# Patient Record
Sex: FEMALE | Race: WHITE | NOT HISPANIC OR LATINO | Employment: UNEMPLOYED | URBAN - METROPOLITAN AREA
[De-identification: names, ages, dates, MRNs, and addresses within clinical notes are randomized per-mention and may not be internally consistent; named-entity substitution may affect disease eponyms.]

---

## 2017-01-09 ENCOUNTER — HOSPITAL ENCOUNTER (OUTPATIENT)
Dept: RADIATION ONCOLOGY | Facility: MEDICAL CENTER | Age: 57
End: 2017-01-31
Attending: RADIOLOGY
Payer: MEDICAID

## 2017-01-09 PROCEDURE — 77332 RADIATION TREATMENT AID(S): CPT | Mod: 26 | Performed by: RADIOLOGY

## 2017-01-09 PROCEDURE — 77332 RADIATION TREATMENT AID(S): CPT | Performed by: RADIOLOGY

## 2017-01-09 PROCEDURE — 77263 THER RADIOLOGY TX PLNG CPLX: CPT | Performed by: RADIOLOGY

## 2017-01-09 PROCEDURE — 77290 THER RAD SIMULAJ FIELD CPLX: CPT | Performed by: RADIOLOGY

## 2017-01-09 PROCEDURE — 77290 THER RAD SIMULAJ FIELD CPLX: CPT | Mod: 26 | Performed by: RADIOLOGY

## 2017-01-16 PROCEDURE — 77295 3-D RADIOTHERAPY PLAN: CPT | Performed by: RADIOLOGY

## 2017-01-16 PROCEDURE — 77334 RADIATION TREATMENT AID(S): CPT | Performed by: RADIOLOGY

## 2017-01-16 PROCEDURE — 77295 3-D RADIOTHERAPY PLAN: CPT | Mod: 26 | Performed by: RADIOLOGY

## 2017-01-16 PROCEDURE — 77300 RADIATION THERAPY DOSE PLAN: CPT | Mod: 26 | Performed by: RADIOLOGY

## 2017-01-16 PROCEDURE — 77334 RADIATION TREATMENT AID(S): CPT | Mod: 26 | Performed by: RADIOLOGY

## 2017-01-16 PROCEDURE — 77300 RADIATION THERAPY DOSE PLAN: CPT | Performed by: RADIOLOGY

## 2017-01-17 PROCEDURE — 77412 RADIATION TX DELIVERY LVL 3: CPT | Performed by: RADIOLOGY

## 2017-01-17 PROCEDURE — 77280 THER RAD SIMULAJ FIELD SMPL: CPT | Performed by: RADIOLOGY

## 2017-01-17 PROCEDURE — 77280 THER RAD SIMULAJ FIELD SMPL: CPT | Mod: 26 | Performed by: RADIOLOGY

## 2017-01-18 ENCOUNTER — APPOINTMENT (OUTPATIENT)
Dept: RADIATION ONCOLOGY | Facility: MEDICAL CENTER | Age: 57
End: 2017-01-18
Attending: RADIOLOGY
Payer: MEDICAID

## 2017-01-18 PROCEDURE — 77412 RADIATION TX DELIVERY LVL 3: CPT | Performed by: RADIOLOGY

## 2017-01-18 PROCEDURE — 77417 THER RADIOLOGY PORT IMAGE(S): CPT | Performed by: RADIOLOGY

## 2017-01-19 PROCEDURE — 77336 RADIATION PHYSICS CONSULT: CPT | Performed by: RADIOLOGY

## 2017-01-19 PROCEDURE — 77412 RADIATION TX DELIVERY LVL 3: CPT | Performed by: RADIOLOGY

## 2017-01-20 PROCEDURE — 77412 RADIATION TX DELIVERY LVL 3: CPT | Performed by: RADIOLOGY

## 2017-01-23 PROCEDURE — 77412 RADIATION TX DELIVERY LVL 3: CPT | Performed by: RADIOLOGY

## 2017-01-23 PROCEDURE — 77427 RADIATION TX MANAGEMENT X5: CPT | Performed by: RADIOLOGY

## 2017-01-23 PROCEDURE — 77417 THER RADIOLOGY PORT IMAGE(S): CPT | Performed by: RADIOLOGY

## 2017-01-24 PROCEDURE — 77412 RADIATION TX DELIVERY LVL 3: CPT | Performed by: RADIOLOGY

## 2017-01-25 PROCEDURE — 77412 RADIATION TX DELIVERY LVL 3: CPT | Performed by: RADIOLOGY

## 2017-01-26 PROCEDURE — 77336 RADIATION PHYSICS CONSULT: CPT | Performed by: RADIOLOGY

## 2017-01-26 PROCEDURE — 77412 RADIATION TX DELIVERY LVL 3: CPT | Performed by: RADIOLOGY

## 2017-01-27 PROCEDURE — 77412 RADIATION TX DELIVERY LVL 3: CPT | Performed by: RADIOLOGY

## 2017-01-30 ENCOUNTER — HOSPITAL ENCOUNTER (OUTPATIENT)
Dept: RADIATION ONCOLOGY | Facility: MEDICAL CENTER | Age: 57
End: 2017-01-30

## 2017-01-30 PROCEDURE — 77417 THER RADIOLOGY PORT IMAGE(S): CPT | Performed by: RADIOLOGY

## 2017-01-30 PROCEDURE — 77427 RADIATION TX MANAGEMENT X5: CPT | Performed by: RADIOLOGY

## 2017-01-30 PROCEDURE — 77412 RADIATION TX DELIVERY LVL 3: CPT | Performed by: RADIOLOGY

## 2017-01-31 ENCOUNTER — HOSPITAL ENCOUNTER (OUTPATIENT)
Dept: RADIATION ONCOLOGY | Facility: MEDICAL CENTER | Age: 57
End: 2017-01-31

## 2017-01-31 PROCEDURE — 77412 RADIATION TX DELIVERY LVL 3: CPT | Performed by: RADIOLOGY

## 2017-02-01 ENCOUNTER — HOSPITAL ENCOUNTER (OUTPATIENT)
Dept: RADIATION ONCOLOGY | Facility: MEDICAL CENTER | Age: 57
End: 2017-02-28
Attending: RADIOLOGY
Payer: MEDICAID

## 2017-02-01 ENCOUNTER — HOSPITAL ENCOUNTER (OUTPATIENT)
Dept: RADIATION ONCOLOGY | Facility: MEDICAL CENTER | Age: 57
End: 2017-02-01

## 2017-02-01 PROCEDURE — 77412 RADIATION TX DELIVERY LVL 3: CPT | Performed by: RADIOLOGY

## 2017-02-02 PROCEDURE — 77336 RADIATION PHYSICS CONSULT: CPT | Performed by: RADIOLOGY

## 2017-02-02 PROCEDURE — 77412 RADIATION TX DELIVERY LVL 3: CPT | Performed by: RADIOLOGY

## 2017-02-03 PROCEDURE — 77412 RADIATION TX DELIVERY LVL 3: CPT | Performed by: RADIOLOGY

## 2017-02-06 PROCEDURE — 77417 THER RADIOLOGY PORT IMAGE(S): CPT | Performed by: RADIOLOGY

## 2017-02-06 PROCEDURE — 77427 RADIATION TX MANAGEMENT X5: CPT | Performed by: RADIOLOGY

## 2017-02-06 PROCEDURE — 77412 RADIATION TX DELIVERY LVL 3: CPT | Performed by: RADIOLOGY

## 2017-03-06 ENCOUNTER — HOSPITAL ENCOUNTER (OUTPATIENT)
Dept: RADIOLOGY | Facility: MEDICAL CENTER | Age: 57
End: 2017-03-06

## 2017-03-21 ENCOUNTER — HOSPITAL ENCOUNTER (OUTPATIENT)
Dept: RADIATION ONCOLOGY | Facility: MEDICAL CENTER | Age: 57
End: 2017-03-31
Attending: RADIOLOGY
Payer: MEDICAID

## 2017-03-21 ENCOUNTER — AMB ONCOLOGY NURSE NAVIGATOR ENCOUNTER (OUTPATIENT)
Dept: OTHER | Facility: MEDICAL CENTER | Age: 57
End: 2017-03-21

## 2017-03-21 DIAGNOSIS — C50.512 BREAST CANCER OF LOWER-OUTER QUADRANT OF LEFT FEMALE BREAST (HCC): ICD-10-CM

## 2017-03-21 DIAGNOSIS — Z65.8 PSYCHOSOCIAL DISTRESS: ICD-10-CM

## 2017-03-21 PROCEDURE — 99212 OFFICE O/P EST SF 10 MIN: CPT | Performed by: RADIOLOGY

## 2017-03-21 NOTE — PROGRESS NOTES
Met with patient to review breast cancer treatment summary and survivorship care plan. Follow up care plan is preliminary. Pt had not yet followed up with her surgeon or seen Medical Oncology. Dr. Robles stated he has referred her to medical oncology and we agreed to update her care plan once she has seen her med onc for discussion of endocrine therapy. Pt requested the treatment summary/survivorship care plan be given to her at this time but stated she understands that it is incomplete for now.     Late or long term effects noted at this visit: fatigue, some pain in the surgery area, pt states both of these are improving.  Pt denied anxiety/depression. Pt denied financial or transportation barriers. Pt is a current smoker, provided her with cessation resources.   General [facility] breast cancer follow up guidelines reviewed with patient. Patient referred to treating physician for individualized follow up schedule and recommendations / questions. Plan is to meet with her again once she has seen her medical oncologist, and be available for questions.     Scanned to EPIC

## 2017-03-22 NOTE — PROCEDURES
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    INTERVAL HISTORY:  I had the pleasure of seeing the patient today in followup   for her breast cancer.  From a symptom standpoint, she states she is doing   remarkably well.  Her erythema and redness has returned back to baseline.  She   does not have any symptoms she would attribute to her radiation at this time.    She has not yet met with medical oncology to discuss any role of tamoxifen   or aromatase inhibitor.  Therefore, I will make a referral to Dr. Kahn for   this formal discussion.  I will put in for her mammograms for October, which   is her annual mammogram date and plan on seeing her after that time.  Should   she have any questions or concerns before that time, she should feel free to   contact me.       ____________________________________     MD HERMELINDO Perera / NTS    DD:  03/21/2017 15:43:44  DT:  03/22/2017 03:22:03    D#:  [id number]  Job#:  [id number]    cc: CHRIS MELO MD, MOHSEN TAMASABY MD, Caesar Kahn MD

## 2017-05-03 ENCOUNTER — PATIENT OUTREACH (OUTPATIENT)
Dept: OTHER | Facility: MEDICAL CENTER | Age: 57
End: 2017-05-03

## 2017-05-09 ENCOUNTER — OFFICE VISIT (OUTPATIENT)
Dept: HEMATOLOGY ONCOLOGY | Facility: MEDICAL CENTER | Age: 57
End: 2017-05-09
Payer: MEDICAID

## 2017-05-09 VITALS
OXYGEN SATURATION: 95 % | RESPIRATION RATE: 16 BRPM | HEART RATE: 84 BPM | BODY MASS INDEX: 36.75 KG/M2 | TEMPERATURE: 98.3 F | WEIGHT: 187.17 LBS | HEIGHT: 60 IN | SYSTOLIC BLOOD PRESSURE: 146 MMHG | DIASTOLIC BLOOD PRESSURE: 94 MMHG

## 2017-05-09 DIAGNOSIS — D48.62 NEOPLASM OF UNCERTAIN BEHAVIOR OF BREAST, LEFT: ICD-10-CM

## 2017-05-09 PROCEDURE — 99203 OFFICE O/P NEW LOW 30 MIN: CPT | Performed by: INTERNAL MEDICINE

## 2017-05-09 RX ORDER — LEVOTHYROXINE SODIUM 0.1 MG/1
100 TABLET ORAL
Status: ON HOLD | COMMUNITY
Start: 2015-10-30 | End: 2018-03-23

## 2017-05-09 RX ORDER — ANASTROZOLE 1 MG/1
1 TABLET ORAL DAILY
Qty: 30 TAB | Refills: 6 | Status: SHIPPED | OUTPATIENT
Start: 2017-05-09 | End: 2018-01-22 | Stop reason: SDUPTHER

## 2017-05-09 ASSESSMENT — PAIN SCALES - GENERAL: PAINLEVEL: NO PAIN

## 2017-05-09 NOTE — MR AVS SNAPSHOT
Lupis Colunga   2017 10:40 AM   Office Visit   MRN: [id number]    Department:  [facility]   Dept Phone:  [phone]    Description:  Female : 1960   Provider:  Caesar Kahn M.D.           Reason for Visit     New Patient Malignant neoplasm of lower outer quadrant of left breast. Ref: Freedom Robles      Allergies as of 2017     Allergen Noted Reactions    Penicillins 2008   Anaphylaxis    Tolerates cephalosporins; blisters      You were diagnosed with     Neoplasm of uncertain behavior of breast, left   [535552]         Vital Signs     Blood Pressure Pulse Temperature Respirations Height Weight    146/94 mmHg 84 36.8 °C (98.3 °F) 16 1.524 m (5') 84.9 kg (187 lb 2.7 oz)    Body Mass Index Oxygen Saturation Breastfeeding? Smoking Status          36.55 kg/m2 95% No Current Every Day Smoker        Basic Information     Date Of Birth Sex Race Ethnicity Preferred Language    1960 Female White Non- English      Your appointments     Aug 29, 2017 10:30 AM   BONE DENSITY (DEXASCAN) with [facility] 1   [facility] ([address])    [address] [place] [address] [address]  [place] [id number] [phone]           No calcium supplements 24 hours prior to exam, including antacids or multivitamins w/calcium.  Pt may eat and drink normally.  No injection procedures prior to Dexa on the same day.  No barium contrast, no CTs with IV or oral contrast, no Pet/CTs and no Nuc Med injections for 7 days prior to a Dexa (including Barium Swallow, Upper GI, Small Bowel Series).  If scheduling a Dexa on the same day as a CT with IV or oral contrast, any test with barium, Pet/CT or a Nuc Med with injection, always schedule the Dexa before the other study.            Aug 31, 2017 10:00 AM   ONCOLOGY EST PATIENT 30 MIN with Caesar Kahn M.D.   [facility] (--)    [address] [facility], [address]  [place] [address] [phone]              Problem List              ICD-10-CM Priority Class Noted - Resolved    Abdominal pain, other specified site R10.9 High  11/12/2013 - Present    Sepsis (CMS-HCC) A41.9 High  11/12/2013 - Present    Metabolic acidosis E87.2 Medium  11/13/2013 - Present    Lactic acidosis E87.2 Medium  11/13/2013 - Present    COPD (chronic obstructive pulmonary disease) (CMS-HCC) J44.9   11/18/2013 - Present    Hypokalemia E87.6   11/18/2013 - Present    Hyponatremia E87.1   11/18/2013 - Present    Thrombocytopenia (CMS-HCC) D69.6   11/18/2013 - Present    Appendicitis K37   11/18/2013 - Present    Troponin level elevated R74.8   11/18/2013 - Present    Obesity E66.9   11/18/2013 - Present    Methamphetamine abuse F15.10   11/18/2013 - Present    NURY (obstructive sleep apnea) G47.33   11/18/2013 - Present    Hypomagnesemia E83.42   11/18/2013 - Present    Bacteremia R78.81   11/18/2013 - Present    Tobacco dependence F17.200   11/18/2013 - Present    Neoplasm of uncertain behavior of breast D48.60   12/5/2016 - Present      Health Maintenance     Patient has no pending health maintenance at this time      Current Immunizations     Influenza TIV (IM) 11/17/2013 11:23 PM    Pneumococcal polysaccharide vaccine (PPSV-23) 11/12/2012      Below and/or attached are the medications your provider expects you to take. Review all of your home medications and newly ordered medications with your provider and/or pharmacist. Follow medication instructions as directed by your provider and/or pharmacist. Please keep your medication list with you and share with your provider. Update the information when medications are discontinued, doses are changed, or new medications (including over-the-counter products) are added; and carry medication information at all times in the event of emergency situations     Allergies:  PENICILLINS - Anaphylaxis               Medications  Valid as of: May 09, 2017 - 11:36 AM    Generic Name Brand Name Tablet Size Instructions for use     Anastrozole (Tab) ARIMIDEX 1 MG Take 1 Tab by mouth every day.        Aspirin (Tablet Delayed Response) ECOTRIN 81 MG Take 81 mg by mouth.        Enalapril Maleate (Tab) VASOTEC 10 MG Take 10 mg by mouth every day.        Levothyroxine Sodium (Tab) SYNTHROID 75 MCG Take 75 mcg by mouth Every morning on an empty stomach.        Levothyroxine Sodium (Tab) SYNTHROID 100 MCG Take 100 mcg by mouth.        Simvastatin (Tab) ZOCOR 20 MG Take 20 mg by mouth every evening.        .                 Medicines prescribed today were sent to:     [facility]/PHARMACY #[id number] - [place], [place] - [address]    [address] [place] [address]    Phone: [phone] Fax: [phone]    Open 24 Hours?: No      Medication refill instructions:       If your prescription bottle indicates you have medication refills left, it is not necessary to call your provider’s office. Please contact your pharmacy and they will refill your medication.    If your prescription bottle indicates you do not have any refills left, you may request refills at any time through one of the following ways: The online [employer] system (except Urgent Care), by calling your provider’s office, or by asking your pharmacy to contact your provider’s office with a refill request. Medication refills are processed only during regular business hours and may not be available until the next business day. Your provider may request additional information or to have a follow-up visit with you prior to refilling your medication.   *Please Note: Medication refills are assigned a new Rx number when refilled electronically. Your pharmacy may indicate that no refills were authorized even though a new prescription for the same medication is available at the pharmacy. Please request the medicine by name with the pharmacy before contacting your provider for a refill.        Your To Do List     Future Labs/Procedures Complete By Expires    DS-BONE DENSITY STUDY (DEXA)  As  directed 5/9/2018         WeYAP Access Code: [email or web address]  Expires: 6/8/2017 11:36 AM    WeYAP  A secure, online tool to manage your health information     [employer]’s WeYAP® is a secure, online tool that connects you to your personalized health information from the privacy of your home -- day or night - making it very easy for you to manage your healthcare. Once the activation process is completed, you can even access your medical information using the WeYAP margot, which is available for free in the Apple Margot store or Google Play store.     WeYAP provides the following levels of access (as shown below):   My Chart Features   [facility] Primary Care Doctor [facility]  Specialists [facility]  Urgent  Care Non-[facility]  Primary Care  Doctor   Email your healthcare team securely and privately 24/7 X X X    Manage appointments: schedule your next appointment; view details of past/upcoming appointments X      Request prescription refills. X      View recent personal medical records, including lab and immunizations X X X X   View health record, including health history, allergies, medications X X X X   Read reports about your outpatient visits, procedures, consult and ER notes X X X X   See your discharge summary, which is a recap of your hospital and/or ER visit that includes your diagnosis, lab results, and care plan. X X       How to register for WeYAP:  1. Go to  [email or web address].  2. Click on the Sign Up Now box, which takes you to the New Member Sign Up page. You will need to provide the following information:  a. Enter your WeYAP Access Code exactly as it appears at the top of this page. (You will not need to use this code after you’ve completed the sign-up process. If you do not sign up before the expiration date, you must request a new code.)   b. Enter your date of birth.   c. Enter your home email address.   d. Click Submit, and follow the next screen’s instructions.  3. Create a WeYAP ID.  This will be your XIFIN login ID and cannot be changed, so think of one that is secure and easy to remember.  4. Create a XIFIN password. You can change your password at any time.  5. Enter your Password Reset Question and Answer. This can be used at a later time if you forget your password.   6. Enter your e-mail address. This allows you to receive e-mail notifications when new information is available in XIFIN.  7. Click Sign Up. You can now view your health information.    For assistance activating your XIFIN account, call [phone]        Quit Tobacco Information     Do you want to quit using tobacco?    Quitting tobacco decreases risks of cancer, heart and lung disease, increases life expectancy, improves sense of taste and smell, and increases spending money, among other benefits.    If you are thinking about quitting, we can help.  • [facility] Quit Tobacco Program: 164.819.6954  o Program occurs weekly for four weeks and includes pharmacist consultation on products to support quitting smoking or chewing tobacco. A provider referral is needed for pharmacist consultation.  • Tobacco Users Help Hotline: 7-615-QUIT-NOW (475-0310) or https://[place].quitlogix.org/  o Free, confidential telephone and online coaching for [place] residents. Sessions are designed on a schedule that is convenient for you. Eligible clients receive free nicotine replacement therapy.  • Nationally: www.smokefree.gov  o Information and professional assistance to support both immediate and long-term needs as you become, and remain, a non-smoker. Smokefree.gov allows you to choose the help that best fits your needs.

## 2017-05-09 NOTE — PROGRESS NOTES
Consult Note: Oncology    Date of consultation: 5/9/2017 11:08 AM    Referring provider: Freedom Robles    Reason for consultation: Stage IA (T1mic N0 M0) microinvasive papillary ductal carcinoma of   the left outer quadrant, ER/TN positive, HER-2/jake negative,       History of presenting illness:     Dear Freedom ,    Thank you very much for allowing me to see  Lupis Colunga today . As you know she  is a 57 y.o. year old female who was found to have a suspicious 1 cm lesion in her screening mammogram and follow-up ultrasound. An ultrasound-guided biopsy showed DCIS with a small foci of microinvasive ductal carcinoma with papillary features. She had partial mastectomy and negative sentinel lymph node dissection. The plan was to do DIEGO catheter placement. However, there was some complications and she ended up getting adjuvant radiation.    Postoperatively pathology confirmed Invasive grade 1 ductal carcinoma arising from solid papillary  carcinoma, ER/TN strongly positive, HER-2/jake negative and Ki-67 less than 10 % .    She is feeling well and denies any acute complaints.        Past Medical History:    Past Medical History   Diagnosis Date   • Hypertension    • Hypothyroidism    • Cancer (CMS-HCC) 2016     breast left   • Dental disorder 2016     dentures upper and lower       Past surgical history:    Past Surgical History   Procedure Laterality Date   • Appendectomy laparoscopic  11/13/2013     Performed by Casey Quevedo M.D. at [facility]   • Other  1977     plastic surgery face post dog bite   • Mastectomy Left 12/5/2016     Procedure: MASTECTOMY partial  with wire localized ;  Surgeon: Skyler Patel M.D.;  Location: [facility];  Service:    • Node biopsy sentinel Left 12/5/2016     Procedure: NODE BIOPSY SENTINEL axillary ;  Surgeon: Skyler Patel M.D.;  Location: [facility];  Service:    • Axillary node dissection Left 12/5/2016     Procedure: AXILLARY NODE  DISSECTION/ kory spacer ;  Surgeon: Skyler Patel M.D.;  Location: SURGERY [facility];  Service:        Allergies:  Penicillins    Medications:    Current Outpatient Prescriptions   Medication Sig Dispense Refill   • aspirin EC (ECOTRIN) 81 MG Tablet Delayed Response Take 81 mg by mouth.     • levothyroxine (SYNTHROID) 100 MCG Tab Take 100 mcg by mouth.     • enalapril (VASOTEC) 10 MG Tab Take 10 mg by mouth every day.     • simvastatin (ZOCOR) 20 MG Tab Take 20 mg by mouth every evening.     • levothyroxine (SYNTHROID) 75 MCG Tab Take 75 mcg by mouth Every morning on an empty stomach.       No current facility-administered medications for this visit.       Social History:     Social History     Social History   • Marital Status: Legally      Spouse Name: N/A   • Number of Children: N/A   • Years of Education: N/A     Occupational History   • Not on file.     Social History Main Topics   • Smoking status: Current Every Day Smoker -- 30 years   • Smokeless tobacco: Not on file      Comment: 1/2 PPD   • Alcohol Use: No      Comment: denies   • Drug Use: Yes      Comment: weed   • Sexual Activity: Not on file     Other Topics Concern   • Not on file     Social History Narrative     GYNECOLOGIC HISTORY:  Patient is a  3, para 2 with age of first    pregnancy of 19, menarche delayed until age 18, menopause at age 47 who did    not use any oral contraceptive or hormone replacement therapies.  Family History: Son with brain tumor   No family history on file.    Review of Systems:  All other review of systems are negative except what was mentioned above in the HPI.    Constitutional: No fever, chills, weight loss ,malaise/fatigue.    HEENT: No new auditory or visual complaints. No sore throat and neck pain.     Respiratory:No new cough, sputum production, shortness of breath and wheezing.    Cardiovascular: No new chest pain, palpitations, orthopnea and leg swelling.    Gastrointestinal: No heartburn,  nausea, vomiting ,abdominal pain, hematochezia or melena     Genitourinary: Negative for dysuria, hematuria    Musculoskeletal: No new arthralgias or myalgias   Skin: Negative for rash and itching.    Neurological: Negative for focal weakness or headaches.    Endo/Heme/Allergies: No abnormal bleed/bruise.    Psychiatric/Behavioral: No new depression/anxiety.    Physical Exam:  Vitals:   /94 mmHg  Pulse 84  Temp(Src) 36.8 °C (98.3 °F)  Resp 16  Ht 1.524 m (5')  Wt 84.9 kg (187 lb 2.7 oz)  BMI 36.55 kg/m2  SpO2 95%  Breastfeeding? No    General: Not in acute distress, alert and oriented x 3  HEENT: No pallor, icterus. Oropharynx clear.   Neck: Supple, no palpable masses.  Lymph nodes: No palpable cervical, supraclavicular, axillary or inguinal lymphadenopathy.    CVS: regular rate and rhythm, no rubs or gallops  RESP: Clear to auscultate bilaterally, no wheezing or crackles.   ABD: Soft, non tender, non distended, positive bowel sounds, no palpable organomegaly  EXT: No edema or cyanosis  CNS: Alert and oriented x3, No focal deficits.  Skin- No rash  Breast-       Labs:   No results for input(s): RBC, HEMOGLOBIN, HEMATOCRIT, PLATELETCT, PROTHROMBTM, APTT, INR, IRON, FERRITIN, TOTIRONBC in the last 72 hours.  Lab Results   Component Value Date/Time    SODIUM 138 11/28/2016 01:48 PM    POTASSIUM 4.0 11/28/2016 01:48 PM    CHLORIDE 105 11/28/2016 01:48 PM    CO2 27 11/28/2016 01:48 PM    GLUCOSE 89 11/28/2016 01:48 PM    BUN 14 11/28/2016 01:48 PM    CREATININE 0.75 11/28/2016 01:48 PM        Assessment and Plan:  Stage IA (T1mic N0 M0) microinvasive papillary ductal carcinoma of the left outer quadrant, ER/MI positive, HER-2/jake negative- she had low risk disease. She is status post adjuvant radiation. We discussed the various adjuvant systemic treatment strategies including chemotherapy and antiestrogen therapy. Given the small size and low risk, she obviously will not have any benefit from adjuvant  chemotherapy. She is postmenopausal and we discussed the option of aromatase inhibitor to reduce the risk of systemic relapse. She is agreeable to this. We discussed the potential adverse effects including, but not limited to hot flashes, arthralgias, bone loss, rare side effects including lipid profile abnormality and possibility of thrombotic events.      I will obtain a baseline DEXA scan. She will start Arimidex. I have given her prescription for this. She will let us know if she develops any unexpected adverse effects. She will return to the clinic in 3 months. She will need annual surveillance mammograms.        She agreed and verbalized her agreement and understanding with the current plan.  I answered all questions and concerns she has at this time.              Thank you for allowing me to participate in her care.    Please note that this dictation was created using voice recognition software. I have made every reasonable attempt to correct obvious errors, but I expect that there are errors of grammar and possibly content that I did not discover before finalizing the note.      SIGNATURES:  Caesar Kahn    CC:  Mohsen Tamasaby, M.D.  Freedom Robles M.*

## 2017-05-16 ENCOUNTER — TELEPHONE (OUTPATIENT)
Dept: HEMATOLOGY ONCOLOGY | Facility: MEDICAL CENTER | Age: 57
End: 2017-05-16

## 2017-05-16 NOTE — TELEPHONE ENCOUNTER
"RD consulted for nutrition assessment.  Patient declined at this time stating \"I'm just fine with eating\".  RD signing off, unless otherwise specified by MD.  Please re-consult BK prn at [phone].  "

## 2017-05-19 ENCOUNTER — PATIENT OUTREACH (OUTPATIENT)
Dept: OTHER | Facility: MEDICAL CENTER | Age: 57
End: 2017-05-19

## 2017-05-19 NOTE — PROGRESS NOTES
Phoned pt to let her know we will be sending an updated breast cancer treatment summary/ survivorship care plan. Will be mailed today 5/19/2017.

## 2017-06-13 ENCOUNTER — TELEPHONE (OUTPATIENT)
Dept: HEMATOLOGY ONCOLOGY | Facility: MEDICAL CENTER | Age: 57
End: 2017-06-13

## 2017-06-13 NOTE — TELEPHONE ENCOUNTER
Lupis would like to see if she could possibly have 30 days on her prescription anastrozole, she does not have a vehicle.    Thank you.

## 2017-06-13 NOTE — TELEPHONE ENCOUNTER
"Per Dr. Kahn called patient to assess how she is tolerating anastrozole.  Patient states she \"is not having any problems at all and is tolerating the medication well.\"  Patient requested her rx be transferred to [facility] on [address]. RN requested transfer of rx and refill on behalf of pt.  Notified pt that medicaid will only authorize 30 day supply of medication. She verbalized understanding.   "

## 2017-08-29 ENCOUNTER — HOSPITAL ENCOUNTER (OUTPATIENT)
Dept: RADIOLOGY | Facility: MEDICAL CENTER | Age: 57
End: 2017-08-29
Attending: INTERNAL MEDICINE
Payer: MEDICAID

## 2017-08-29 DIAGNOSIS — D48.62 NEOPLASM OF UNCERTAIN BEHAVIOR OF BREAST, LEFT: ICD-10-CM

## 2017-08-29 PROCEDURE — 77080 DXA BONE DENSITY AXIAL: CPT

## 2017-09-06 ENCOUNTER — OFFICE VISIT (OUTPATIENT)
Dept: HEMATOLOGY ONCOLOGY | Facility: MEDICAL CENTER | Age: 57
End: 2017-09-06
Payer: MEDICAID

## 2017-09-06 VITALS
HEART RATE: 86 BPM | TEMPERATURE: 97.1 F | WEIGHT: 184.08 LBS | RESPIRATION RATE: 16 BRPM | SYSTOLIC BLOOD PRESSURE: 136 MMHG | BODY MASS INDEX: 36.14 KG/M2 | HEIGHT: 60 IN | OXYGEN SATURATION: 96 % | DIASTOLIC BLOOD PRESSURE: 90 MMHG

## 2017-09-06 DIAGNOSIS — D48.62 NEOPLASM OF UNCERTAIN BEHAVIOR OF BREAST, LEFT: ICD-10-CM

## 2017-09-06 PROCEDURE — 99213 OFFICE O/P EST LOW 20 MIN: CPT | Performed by: INTERNAL MEDICINE

## 2017-09-06 ASSESSMENT — PAIN SCALES - GENERAL: PAINLEVEL: NO PAIN

## 2017-09-06 NOTE — PROGRESS NOTES
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    History of presenting illness: Lupis Colunga  is a 57 y.o. year old female who is here for follow-up of her early stage breast carcinoma. She is status post partial mastectomy and negative sentinel lymph node dissection on 12/2016. The plan was to do KORY catheter placement. However, there was some complications and she ended up getting adjuvant radiation.     Postoperatively pathology confirmed Invasive grade 1 ductal carcinoma arising from solid papillary  carcinoma, ER/TN strongly positive, HER-2/jake negative and Ki-67 less than 10 % . She was started on Arimidex 3 months ago. She is tolerating Advair with no major complaints at this point. She had some initial mild hot flashes, which subsequently resolved. DEXA scan showed  normal bone density.      Past Medical History:      Past Medical History:   Diagnosis Date   • Cancer (CMS-HCC) 2016    breast left   • Dental disorder 2016    dentures upper and lower   • Hypertension    • Hypothyroidism        Past surgical history:       Past Surgical History:   Procedure Laterality Date   • MASTECTOMY Left 12/5/2016    Procedure: MASTECTOMY partial  with wire localized ;  Surgeon: Skyler Patel M.D.;  Location: [facility];  Service:    • NODE BIOPSY SENTINEL Left 12/5/2016    Procedure: NODE BIOPSY SENTINEL axillary ;  Surgeon: Skyler Patel M.D.;  Location: [facility];  Service:    • AXILLARY NODE DISSECTION Left 12/5/2016    Procedure: AXILLARY NODE DISSECTION/ kory spacer ;  Surgeon: Skyler Patel M.D.;  Location: [facility];  Service:    • APPENDECTOMY LAPAROSCOPIC  11/13/2013    Performed by Casey Quevedo M.D. at [facility]   • OTHER  1977    plastic surgery face post dog bite       Allergies:        Penicillins    Medications:         Current Outpatient Prescriptions   Medication Sig Dispense Refill   • aspirin EC (ECOTRIN) 81 MG Tablet Delayed Response Take 81 mg by mouth.     • anastrozole (ARIMIDEX) 1 MG Tab Take 1 Tab by mouth every day. 30 Tab 6   • enalapril (VASOTEC) 10 MG Tab Take 10 mg by mouth every day.     • simvastatin (ZOCOR) 20 MG Tab Take 20 mg by mouth every evening.     • levothyroxine (SYNTHROID) 75 MCG Tab Take 75 mcg by mouth Every morning on an empty stomach.     • levothyroxine (SYNTHROID) 100 MCG Tab Take 100 mcg by mouth.       No current facility-administered medications for this visit.          Social History:     Social History     Social History   • Marital status: Legally      Spouse name: N/A   • Number of children: N/A   • Years of education: N/A     Occupational History   • Not on file.     Social History Main Topics   • Smoking status: Current Every Day Smoker     Years: 30.00   • Smokeless tobacco: Not on file      Comment: 1/2 PPD   • Alcohol use No      Comment: denies   • Drug use:       Comment: weed   • Sexual activity: Not on file     Other Topics Concern   • Not on file     Social History Narrative   • No narrative on file       Family History:    No family history on file.    Review of Systems:  All other review of systems are negative except what was mentioned above in the HPI.    Constitutional: Negative for fever, chills, weight loss and malaise/fatigue.    HEENT: No new auditory or visual complaints. No sore throat and neck pain.     Respiratory: Negative for cough, sputum production, shortness of breath and wheezing.    Cardiovascular: Negative for chest pain, palpitations, orthopnea and leg swelling.    Gastrointestinal: Negative for heartburn, nausea, vomiting and abdominal pain.    Genitourinary: Negative for dysuria, hematuria    Musculoskeletal: No new arthralgias or myalgias   Skin: Negative for rash and itching.    Neurological: Negative for focal  weakness and headaches.    Endo/Heme/Allergies: No abnormal bleed/bruise.    Psychiatric/Behavioral: No new depression/anxiety.    Physical Exam:  Vitals: /90   Pulse 86   Temp 36.2 °C (97.1 °F)   Resp 16   Ht 1.524 m (5')   Wt 83.5 kg (184 lb 1.4 oz)   SpO2 96%   Breastfeeding? No   BMI 35.95 kg/m²     General: Not in acute distress, alert and oriented x 3  HEENT: No pallor, icterus. Oropharynx clear.   Neck: Supple, no palpable masses.  Lymph nodes: No palpable cervical, supraclavicular, axillary or inguinal lymphadenopathy.    CVS: regular rate and rhythm, no rubs or gallops  RESP: Clear to auscultate bilaterally, no wheezing or crackles.   ABD: Soft, non tender, non distended, positive bowel sounds, no palpable organomegaly  EXT: No edema or cyanosis  CNS: Alert and oriented x3, No focal deficits.  Skin- No rash  Breast- well-healed lumpectomy scar with minimal scarring. No dominant masses or nodules.    Labs:   No visits with results within 1 Week(s) from this visit.   Latest known visit with results is:   Orders Only on 2016   Component Date Value Ref Range Status   • Report 2016    Final                    Value:[facility]    Test Date:  2016  Pt Name:    SPENCER INFANTE             Department: [facility]  MRN:        [id number]                      Room:  Gender:     F                            Technician: SANDI  :        1960                   Requested By:CHRIS MELO  Order #:    [id number]                    Reading MD: Eleanor Sanchez MD    Measurements  Intervals                                Axis  Rate:       80                           P:          57  ID:         [id number]                          QRS:        78  QRSD:       86                           T:          63  QT:         360  QTc:        416    Interpretive Statements  SINUS RHYTHM  BORDERLINE T ABNORMALITIES, ANT-LAT LEADS  Compared to ECG 2013 13:01:33  T-wave abnormality now present  Sinus  tachycardia no longer present  Inferior Q waves no longer present  Q waves no longer present    Electronically Signed On 11- 14:29:43 PST by Eleanor Sanchez MD     • WBC 11/28/2016 6.1  4.8 - 10.8 K/uL Final   • RBC 11/28/2016 5.37  4.20 - 5.40 M/uL Final   • Hemoglobin 11/28/2016 16.4* 12.0 - 16.0 g/dL Final   • Hematocrit 11/28/2016 49.8* 37.0 - 47.0 % Final   • MCV 11/28/2016 92.7  81.4 - 97.8 fL Final   • MCH 11/28/2016 30.5  27.0 - 33.0 pg Final   • MCHC 11/28/2016 32.9* 33.6 - 35.0 g/dL Final   • RDW 11/28/2016 43.3  35.9 - 50.0 fL Final   • Platelet Count 11/28/2016 194  164 - 446 K/uL Final   • MPV 11/28/2016 10.5  9.0 - 12.9 fL Final   • Neutrophils-Polys 11/28/2016 57.10  44.00 - 72.00 % Final   • Lymphocytes 11/28/2016 34.00  22.00 - 41.00 % Final   • Monocytes 11/28/2016 6.80  0.00 - 13.40 % Final   • Eosinophils 11/28/2016 0.70  0.00 - 6.90 % Final   • Basophils 11/28/2016 0.70  0.00 - 1.80 % Final   • Immature Granulocytes 11/28/2016 0.70  0.00 - 0.90 % Final   • Nucleated RBC 11/28/2016 0.00  /100 WBC Final   • Neutrophils (Absolute) 11/28/2016 3.46  2.00 - 7.15 K/uL Final   • Lymphs (Absolute) 11/28/2016 2.06  1.00 - 4.80 K/uL Final   • Monos (Absolute) 11/28/2016 0.41  0.00 - 0.85 K/uL Final   • Eos (Absolute) 11/28/2016 0.04  0.00 - 0.51 K/uL Final   • Baso (Absolute) 11/28/2016 0.04  0.00 - 0.12 K/uL Final   • Immature Granulocytes (abs) 11/28/2016 0.04  0.00 - 0.11 K/uL Final   • NRBC (Absolute) 11/28/2016 0.00  K/uL Final   • Sodium 11/28/2016 138  135 - 145 mmol/L Final   • Potassium 11/28/2016 4.0  3.6 - 5.5 mmol/L Final   • Chloride 11/28/2016 105  96 - 112 mmol/L Final   • Co2 11/28/2016 27  20 - 33 mmol/L Final   • Anion Gap 11/28/2016 6.0  0.0 - 11.9 Final   • Glucose 11/28/2016 89  65 - 99 mg/dL Final   • Bun 11/28/2016 14  8 - 22 mg/dL Final   • Creatinine 11/28/2016 0.75  0.50 - 1.40 mg/dL Final   • Calcium 11/28/2016 10.5  8.5 - 10.5 mg/dL Final   • AST(SGOT) 11/28/2016 20  12 -  45 U/L Final   • ALT(SGPT) 11/28/2016 25  2 - 50 U/L Final   • Alkaline Phosphatase 11/28/2016 87  30 - 99 U/L Final   • Total Bilirubin 11/28/2016 0.6  0.1 - 1.5 mg/dL Final   • Albumin 11/28/2016 4.2  3.2 - 4.9 g/dL Final   • Total Protein 11/28/2016 7.5  6.0 - 8.2 g/dL Final   • Globulin 11/28/2016 3.3  1.9 - 3.5 g/dL Final   • A-G Ratio 11/28/2016 1.3  g/dL Final   • GFR If  11/28/2016 >60  >60 mL/min/1.73 m 2 Final   • GFR If Non  11/28/2016 >60  >60 mL/min/1.73 m 2 Final             Assessment and Plan:  Stage IA (T1mic N0 M0) microinvasive papillary 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 She is status post adjuvant radiation. She was started on Arimidex on 5/2017, which she is tolerating well so far. She is due for a mammogram in March 2018 at [facility]. I will see her back after that. Bone density was normal. Informed her about the potential for Arimidex to cause some osteopenia and instructed to start over-the-counter calcium. Vitamin D.      She agreed and verbalized her agreement and understanding with the current plan.  I answered all questions and concerns she has at this time         Please note that this dictation was created using voice recognition software. I have made every reasonable attempt to correct obvious errors, but I expect that there are errors of grammar and possibly content that I did not discover before finalizing the note.      SIGNATURES:  Caesar Kahn    CC:  Mohsen Tamasaby, M.D.  No ref. provider found

## 2017-10-16 ENCOUNTER — HOSPITAL ENCOUNTER (OUTPATIENT)
Dept: RADIOLOGY | Facility: MEDICAL CENTER | Age: 57
End: 2017-10-16

## 2017-10-17 ENCOUNTER — HOSPITAL ENCOUNTER (OUTPATIENT)
Dept: RADIATION ONCOLOGY | Facility: MEDICAL CENTER | Age: 57
End: 2017-10-31
Attending: RADIOLOGY
Payer: MEDICAID

## 2017-10-17 VITALS
TEMPERATURE: 98.8 F | DIASTOLIC BLOOD PRESSURE: 86 MMHG | SYSTOLIC BLOOD PRESSURE: 157 MMHG | BODY MASS INDEX: 35.54 KG/M2 | WEIGHT: 182 LBS | HEART RATE: 83 BPM | OXYGEN SATURATION: 98 %

## 2017-10-17 PROCEDURE — 99214 OFFICE O/P EST MOD 30 MIN: CPT | Performed by: RADIOLOGY

## 2017-10-17 PROCEDURE — 99212 OFFICE O/P EST SF 10 MIN: CPT | Performed by: RADIOLOGY

## 2017-10-17 ASSESSMENT — PAIN SCALES - GENERAL: PAINLEVEL: NO PAIN

## 2017-10-17 NOTE — NON-PROVIDER
Patient was seen today in clinic with Dr. Robles for Breast Cancer Follow Up.  Vitals signs and weight were obtained and pain assessment was completed.  Allergies and medications were reviewed with the patient.  Review of systems completed.     Vitals/Pain:  Vitals:    10/17/17 1030   BP: 157/86   Pulse: 83   Temp: 37.1 °C (98.8 °F)   SpO2: 98%   Weight: 82.6 kg (182 lb)   Pain Score: No pain        Allergies:   Penicillins    Current Medications:  Current Outpatient Prescriptions   Medication Sig Dispense Refill   • aspirin EC (ECOTRIN) 81 MG Tablet Delayed Response Take 81 mg by mouth.     • levothyroxine (SYNTHROID) 100 MCG Tab Take 100 mcg by mouth.     • anastrozole (ARIMIDEX) 1 MG Tab Take 1 Tab by mouth every day. 30 Tab 6   • enalapril (VASOTEC) 10 MG Tab Take 10 mg by mouth every day.     • simvastatin (ZOCOR) 20 MG Tab Take 20 mg by mouth every evening.     • levothyroxine (SYNTHROID) 75 MCG Tab Take 75 mcg by mouth Every morning on an empty stomach.       No current facility-administered medications for this encounter.        Review of Systems:   Review of systems completed and scanned into chart. No issues at this time.    PCP:  Winston Escalante, Med Ass't  10/17/2017  10:33 AM

## 2018-01-22 ENCOUNTER — TELEPHONE (OUTPATIENT)
Dept: HEMATOLOGY ONCOLOGY | Facility: MEDICAL CENTER | Age: 58
End: 2018-01-22

## 2018-01-22 DIAGNOSIS — Z17.0 MALIGNANT NEOPLASM OF UPPER-OUTER QUADRANT OF LEFT BREAST IN FEMALE, ESTROGEN RECEPTOR POSITIVE (HCC): ICD-10-CM

## 2018-01-22 DIAGNOSIS — D48.62 NEOPLASM OF UNCERTAIN BEHAVIOR OF BREAST, LEFT: ICD-10-CM

## 2018-01-22 DIAGNOSIS — C50.412 MALIGNANT NEOPLASM OF UPPER-OUTER QUADRANT OF LEFT BREAST IN FEMALE, ESTROGEN RECEPTOR POSITIVE (HCC): ICD-10-CM

## 2018-01-22 RX ORDER — ANASTROZOLE 1 MG/1
1 TABLET ORAL DAILY
Qty: 30 TAB | Refills: 5 | Status: SHIPPED | OUTPATIENT
Start: 2018-01-22 | End: 2018-07-12 | Stop reason: SDUPTHER

## 2018-01-22 NOTE — TELEPHONE ENCOUNTER
Called patient to inform her that her anastrozole (ARIMIDEX) 1 MG Tab has been refilled by JOSE LUIS Apple.

## 2018-02-07 ENCOUNTER — TELEPHONE (OUTPATIENT)
Dept: HEMATOLOGY ONCOLOGY | Facility: MEDICAL CENTER | Age: 58
End: 2018-02-07

## 2018-02-08 NOTE — TELEPHONE ENCOUNTER
Left message to inform patient I reschedule her upcoming appointment since Dr. Kahn is on call. Please confirm the new date and time and reschedule if needed.

## 2018-03-14 ENCOUNTER — APPOINTMENT (OUTPATIENT)
Dept: HEMATOLOGY ONCOLOGY | Facility: MEDICAL CENTER | Age: 58
End: 2018-03-14
Payer: MEDICAID

## 2018-03-23 ENCOUNTER — APPOINTMENT (OUTPATIENT)
Dept: RADIOLOGY | Facility: MEDICAL CENTER | Age: 58
End: 2018-03-23
Attending: SURGERY
Payer: MEDICAID

## 2018-03-23 ENCOUNTER — HOSPITAL ENCOUNTER (OUTPATIENT)
Facility: MEDICAL CENTER | Age: 58
End: 2018-03-23
Attending: SURGERY | Admitting: SURGERY
Payer: MEDICAID

## 2018-03-23 VITALS
SYSTOLIC BLOOD PRESSURE: 127 MMHG | WEIGHT: 179.45 LBS | RESPIRATION RATE: 16 BRPM | HEIGHT: 60 IN | OXYGEN SATURATION: 92 % | BODY MASS INDEX: 35.23 KG/M2 | HEART RATE: 86 BPM | DIASTOLIC BLOOD PRESSURE: 78 MMHG | TEMPERATURE: 96.9 F

## 2018-03-23 DIAGNOSIS — G89.29 WRIST PAIN, CHRONIC, RIGHT: ICD-10-CM

## 2018-03-23 DIAGNOSIS — M25.531 WRIST PAIN, CHRONIC, RIGHT: ICD-10-CM

## 2018-03-23 PROCEDURE — A4565 SLINGS: HCPCS | Performed by: SURGERY

## 2018-03-23 PROCEDURE — 160048 HCHG OR STATISTICAL LEVEL 1-5: Performed by: SURGERY

## 2018-03-23 PROCEDURE — 160028 HCHG SURGERY MINUTES - 1ST 30 MINS LEVEL 3: Performed by: SURGERY

## 2018-03-23 PROCEDURE — 700101 HCHG RX REV CODE 250

## 2018-03-23 PROCEDURE — 501838 HCHG SUTURE GENERAL: Performed by: SURGERY

## 2018-03-23 PROCEDURE — 160039 HCHG SURGERY MINUTES - EA ADDL 1 MIN LEVEL 3: Performed by: SURGERY

## 2018-03-23 PROCEDURE — 73100 X-RAY EXAM OF WRIST: CPT | Mod: RT

## 2018-03-23 PROCEDURE — 160035 HCHG PACU - 1ST 60 MINS PHASE I: Performed by: SURGERY

## 2018-03-23 PROCEDURE — 160002 HCHG RECOVERY MINUTES (STAT): Performed by: SURGERY

## 2018-03-23 PROCEDURE — 500881 HCHG PACK, EXTREMITY: Performed by: SURGERY

## 2018-03-23 PROCEDURE — 160046 HCHG PACU - 1ST 60 MINS PHASE II: Performed by: SURGERY

## 2018-03-23 PROCEDURE — 700111 HCHG RX REV CODE 636 W/ 250 OVERRIDE (IP)

## 2018-03-23 PROCEDURE — 160009 HCHG ANES TIME/MIN: Performed by: SURGERY

## 2018-03-23 PROCEDURE — 160025 RECOVERY II MINUTES (STATS): Performed by: SURGERY

## 2018-03-23 RX ORDER — MAGNESIUM HYDROXIDE 1200 MG/15ML
LIQUID ORAL
Status: DISCONTINUED | OUTPATIENT
Start: 2018-03-23 | End: 2018-03-23 | Stop reason: HOSPADM

## 2018-03-23 RX ORDER — OXYCODONE HYDROCHLORIDE 5 MG/1
5 TABLET ORAL EVERY 4 HOURS PRN
Qty: 30 TAB | Refills: 0 | Status: SHIPPED | OUTPATIENT
Start: 2018-03-23 | End: 2018-04-02

## 2018-03-23 RX ORDER — SODIUM CHLORIDE, SODIUM LACTATE, POTASSIUM CHLORIDE, CALCIUM CHLORIDE 600; 310; 30; 20 MG/100ML; MG/100ML; MG/100ML; MG/100ML
1000 INJECTION, SOLUTION INTRAVENOUS
Status: COMPLETED | OUTPATIENT
Start: 2018-03-23 | End: 2018-03-23

## 2018-03-23 RX ORDER — BUPIVACAINE HYDROCHLORIDE AND EPINEPHRINE 5; 5 MG/ML; UG/ML
INJECTION, SOLUTION EPIDURAL; INTRACAUDAL; PERINEURAL
Status: DISCONTINUED | OUTPATIENT
Start: 2018-03-23 | End: 2018-03-23 | Stop reason: HOSPADM

## 2018-03-23 RX ORDER — LEVOTHYROXINE SODIUM 0.07 MG/1
75 TABLET ORAL
COMMUNITY

## 2018-03-23 RX ADMIN — SODIUM CHLORIDE, SODIUM LACTATE, POTASSIUM CHLORIDE, CALCIUM CHLORIDE 1000 ML: 600; 310; 30; 20 INJECTION, SOLUTION INTRAVENOUS at 17:16

## 2018-03-23 ASSESSMENT — PAIN SCALES - GENERAL
PAINLEVEL_OUTOF10: 0
PAINLEVEL_OUTOF10: 3
PAINLEVEL_OUTOF10: 0

## 2018-03-24 NOTE — OP REPORT
DATE OF SERVICE:  03/23/2018    SURGEON:  Ghulam Kaiser MD.    ASSISTANT:  None.    PREOPERATIVE DIAGNOSIS:  Right wrist temporary hardware.    POSTOPERATIVE DIAGNOSIS:  Right wrist temporary hardware.    PROCEDURE:  Right wrist deep hardware removal.    ANESTHESIOLOGIST:  Westley Saleh MD.    ANESTHESIA:  General.    COMPLICATIONS:  None noted.    DRAINS:  None.    SPECIMENS:  None.    ESTIMATED BLOOD LOSS:  Minimal.    INDICATIONS:  The patient is a 57-year-old female well known to me for carpal   ligaments repair with temporary pinning.  The wires deemed to be long enough.    Her scapholunate wire has migrated deep, I cannot feel it superficially to be   removed in the office.  Therefore, recommended removal under anesthesia.    Prior to the procedure, she understood the risks, benefits and alternatives to   surgery.  She understood the risks to include, but not limited to the risk of   infection requiring repeat surgery, bleeding requiring blood transfusion,   nerve, blood vessel, tendon injury requiring repair, chronic pain, stiffness,   chronic regional pain syndrome, or failure to alleviate her symptoms requiring   revision surgery such as wrist arthrodesis, DVT, pulmonary embolism, heart   attack, stroke, and even death.  The patient states despite these risks, she   wished to proceed with surgery.    DESCRIPTION OF PROCEDURE:  Patient was met in the preoperative holding area.    Her right wrist was initialed as correct operative site.  She had an   opportunity to ask questions, all questions were answered and informed consent   was obtained.    The patient brought to the operating room and laid supine on the operating   table.  All bony and dependent prominences were well-padded.  General   anesthesia was induced without any complication.  Ancef was administered for   infection prophylaxis.  The right upper extremity was prepped and draped in   the usual sterile fashion.  Formal time-out was  performed, in which all   parties agreed upon the correct patient, procedure, and operative site.  I   began the procedure by using Esmarch to exsanguinate the extremity and   inflated the tourniquet to 250 mmHg and located the pin most superficially on   the ulnar side of the wrist that had migrated ulnarward.  I then localized it   under fluoroscopy, made an approximately 2 cm longitudinal incision interval   between ECU and FCU and identified the superficial branch and ulnar nerves   gently retracted throughout the case.  I then dissected into the interval   between ECU and FCU and noted the wire was just come through the ulnar capsule   in this location. It was retrieved with a sterile needle .  The ulnar   carpal ligaments appeared to be intact.  I then copiously irrigated the wound   with normal saline, deflated the tourniquet, used Bovie cautery to achieve   hemostasis, closed the incision with 4-0 nylon suture, covered with sterile   Xeroform, 4x4s, and a well-padded splint.  Patient awoke from anesthesia   without any complication and was transferred in a stable condition to PACU   where she had no immediate postoperative complaints.    POSTOPERATIVE PLAN:  The patient will be discharged home per same day   criteria, sedentary use of the upper extremity and follow up in clinic in   10-14 days for suture removal and wound check and placement into a right   [place] cast at that time in our casting department.  Follow up with me in 1   month from surgery.       ____________________________________     MD SVEN Khanna / ZAFAR    DD:  03/23/2018 19:23:06  DT:  03/23/2018 20:15:00    D#:  [id number]  Job#:  [id number]

## 2018-03-24 NOTE — DISCHARGE INSTRUCTIONS
ACTIVITY: Rest and take it easy for the first 24 hours.  A responsible adult is recommended to remain with you during that time.  It is normal to feel sleepy.  We encourage you to not do anything that requires balance, judgment or coordination.    MILD FLU-LIKE SYMPTOMS ARE NORMAL. YOU MAY EXPERIENCE GENERALIZED MUSCLE ACHES, THROAT IRRITATION, HEADACHE AND/OR SOME NAUSEA.    FOR 24 HOURS DO NOT:  Drive, operate machinery or run household appliances.  Drink beer or alcoholic beverages.   Make important decisions or sign legal documents.    SPECIAL INSTRUCTIONS:   Keep splint on, clean, and dry until clinic follow-up. Elevate above heart and ice frequently for at least 2 weeks.  No heavy lifting or grasping for at least 6 weeks.   No driving while on narcotic pain medications. Contact auto-insurance carrier for clearance to begin driving again.   Call MD or come to ER for any major concerns.      DIET: To avoid nausea, slowly advance diet as tolerated, avoiding spicy or greasy foods for the first day.  Add more substantial food to your diet according to your physician's instructions.  Babies can be fed formula or breast milk as soon as they are hungry.  INCREASE FLUIDS AND FIBER TO AVOID CONSTIPATION.    SURGICAL DRESSING/BATHING: *see above instructions    FOLLOW-UP APPOINTMENT:  A follow-up appointment should be arranged with your doctor.  Call to schedule.    You should CALL YOUR PHYSICIAN if you develop:  Fever greater than 101 degrees F.  Pain not relieved by medication, or persistent nausea or vomiting.  Excessive bleeding (blood soaking through dressing) or unexpected drainage from the wound.  Extreme redness or swelling around the incision site, drainage of pus or foul smelling drainage.  Inability to urinate or empty your bladder within 8 hours.  Problems with breathing or chest pain.    You should call 911 if you develop problems with breathing or chest pain.  If you are unable to contact your doctor or  surgical center, you should go to the nearest emergency room or urgent care center.  Physician's telephone #: **Dr. Kaiser [phone]*    If any questions arise, call your doctor.  If your doctor is not available, please feel free to call the Surgical Center at [phone].  The Center is open Monday through Friday from 7AM to 7PM.  You can also call the HEALTH HOTLINE open 24 hours/day, 7 days/week and speak to a nurse at [phone], or toll free at [phone].    A registered nurse may call you a few days after your surgery to see how you are doing after your procedure.    MEDICATIONS: Resume taking daily medication.  Take prescribed pain medication with food.  If no medication is prescribed, you may take non-aspirin pain medication if needed.  PAIN MEDICATION CAN BE VERY CONSTIPATING.  Take a stool softener or laxative such as senokot, pericolace, or milk of magnesia if needed.        If your physician has prescribed pain medication that includes Acetaminophen (Tylenol), do not take additional Acetaminophen (Tylenol) while taking the prescribed medication.    Depression / Suicide Risk    As you are discharged from this [facility] facility, it is important to learn how to keep safe from harming yourself.    Recognize the warning signs:  · Abrupt changes in personality, positive or negative- including increase in energy   · Giving away possessions  · Change in eating patterns- significant weight changes-  positive or negative  · Change in sleeping patterns- unable to sleep or sleeping all the time   · Unwillingness or inability to communicate  · Depression  · Unusual sadness, discouragement and loneliness  · Talk of wanting to die  · Neglect of personal appearance   · Rebelliousness- reckless behavior  · Withdrawal from people/activities they love  · Confusion- inability to concentrate     If you or a loved one observes any of these behaviors or has concerns about self-harm, here's what you can  do:  · Talk about it- your feelings and reasons for harming yourself  · Remove any means that you might use to hurt yourself (examples: pills, rope, extension cords, firearm)  · Get professional help from the community (Mental Health, Substance Abuse, psychological counseling)  · Do not be alone:Call your Safe Contact- someone whom you trust who will be there for you.  · Call your local CRISIS HOTLINE 339-7167 or 406-523-8941  · Call your local Children's Mobile Crisis Response Team [place] (604) 787-0992 or www.[employer]  · Call the toll free National Suicide Prevention Hotlines   · National Suicide Prevention Lifeline 470-312-SGRQ (1478)  · National Hope Line Network 800-SUICIDE (353-5023)

## 2018-07-12 DIAGNOSIS — C50.412 MALIGNANT NEOPLASM OF UPPER-OUTER QUADRANT OF LEFT BREAST IN FEMALE, ESTROGEN RECEPTOR POSITIVE (HCC): ICD-10-CM

## 2018-07-12 DIAGNOSIS — Z17.0 MALIGNANT NEOPLASM OF UPPER-OUTER QUADRANT OF LEFT BREAST IN FEMALE, ESTROGEN RECEPTOR POSITIVE (HCC): ICD-10-CM

## 2018-07-12 RX ORDER — ANASTROZOLE 1 MG/1
1 TABLET ORAL DAILY
Qty: 30 TAB | Refills: 0 | Status: SHIPPED | OUTPATIENT
Start: 2018-07-12 | End: 2018-08-14 | Stop reason: SDUPTHER

## 2018-07-12 NOTE — TELEPHONE ENCOUNTER
Was the patient seen in the last year in this department? Yes     Does patient have an active prescription for medications requested? No     Received Request Via: Pharmacy     Has no follow up appointment  with Dr Kahn is ok to full ?

## 2018-07-12 NOTE — TELEPHONE ENCOUNTER
Refill request for Arimidex received. Patient has not followed up in the clinic since September 2017. She was due for repeat mammogram in March and a follow-up with Dr. Kahn. Patient has not been seen. I will give patient a one-month refill and patient needs to be seen and scheduled with Dr. Kahn before any further refills will be given. She will also need to have a mammogram completed prior to seeing Dr. Kahn.

## 2018-07-25 ENCOUNTER — TELEPHONE (OUTPATIENT)
Dept: HEMATOLOGY ONCOLOGY | Facility: MEDICAL CENTER | Age: 58
End: 2018-07-25

## 2018-07-25 NOTE — TELEPHONE ENCOUNTER
I called left a message regarding her refill on anastrozole (ARIMIDEX) 1 MG Tab, per as the request of Mary Ann AMAYA patient has to make an appointment with the Oncologist Dr Kahn and he will order her Mammogram that is due. At the time of the visit then. Patient hasn't been seen by Dr Kahn since 2017.  Thank you

## 2018-08-14 DIAGNOSIS — Z17.0 MALIGNANT NEOPLASM OF LOWER-OUTER QUADRANT OF LEFT BREAST OF FEMALE, ESTROGEN RECEPTOR POSITIVE (HCC): ICD-10-CM

## 2018-08-14 DIAGNOSIS — Z17.0 MALIGNANT NEOPLASM OF UPPER-OUTER QUADRANT OF LEFT BREAST IN FEMALE, ESTROGEN RECEPTOR POSITIVE (HCC): ICD-10-CM

## 2018-08-14 DIAGNOSIS — C50.412 MALIGNANT NEOPLASM OF UPPER-OUTER QUADRANT OF LEFT BREAST IN FEMALE, ESTROGEN RECEPTOR POSITIVE (HCC): ICD-10-CM

## 2018-08-14 DIAGNOSIS — C50.512 MALIGNANT NEOPLASM OF LOWER-OUTER QUADRANT OF LEFT BREAST OF FEMALE, ESTROGEN RECEPTOR POSITIVE (HCC): ICD-10-CM

## 2018-08-14 RX ORDER — ANASTROZOLE 1 MG/1
1 TABLET ORAL DAILY
Qty: 30 TAB | Refills: 0 | Status: SHIPPED | OUTPATIENT
Start: 2018-08-14 | End: 2018-09-12 | Stop reason: SDUPTHER

## 2018-08-14 NOTE — PROGRESS NOTES
Patient needs mammogram prior to being seen at the end of this month.  She was due for a mammogram in March 2018.  Order has been placed.  Will give patient 1 more 30 day supply of Arimidex as patient has scheduled appointment with Dr. Kahn on August 29, 2018.  No further refills will be given to patient until patient is seen by Dr. Kahn.

## 2018-08-14 NOTE — TELEPHONE ENCOUNTER
Was the patient seen in the last year in this department? Yes    Does patient have an active prescription for medications requested? No     Received Request Via: Patient

## 2018-08-16 ENCOUNTER — TELEPHONE (OUTPATIENT)
Dept: HEMATOLOGY ONCOLOGY | Facility: MEDICAL CENTER | Age: 58
End: 2018-08-16

## 2018-08-16 DIAGNOSIS — D48.62 NEOPLASM OF UNCERTAIN BEHAVIOR OF LEFT BREAST: ICD-10-CM

## 2018-08-16 NOTE — TELEPHONE ENCOUNTER
Fadia from [facility] called wanting a US- Breast ordered for patient. Per their protocol they need a US ordered with the MA-Mammo. Fadia can be reached to [phone] and fax # [phone]

## 2018-08-29 ENCOUNTER — APPOINTMENT (OUTPATIENT)
Dept: HEMATOLOGY ONCOLOGY | Facility: MEDICAL CENTER | Age: 58
End: 2018-08-29
Payer: MEDICAID

## 2018-09-12 DIAGNOSIS — Z17.0 MALIGNANT NEOPLASM OF UPPER-OUTER QUADRANT OF LEFT BREAST IN FEMALE, ESTROGEN RECEPTOR POSITIVE (HCC): ICD-10-CM

## 2018-09-12 DIAGNOSIS — C50.412 MALIGNANT NEOPLASM OF UPPER-OUTER QUADRANT OF LEFT BREAST IN FEMALE, ESTROGEN RECEPTOR POSITIVE (HCC): ICD-10-CM

## 2018-09-12 RX ORDER — ANASTROZOLE 1 MG/1
TABLET ORAL
Qty: 30 TAB | Refills: 1 | Status: SHIPPED | OUTPATIENT
Start: 2018-09-12 | End: 2018-12-19 | Stop reason: SDUPTHER

## 2018-09-13 ENCOUNTER — TELEPHONE (OUTPATIENT)
Dept: HEMATOLOGY ONCOLOGY | Facility: MEDICAL CENTER | Age: 58
End: 2018-09-13

## 2018-09-27 ENCOUNTER — TELEPHONE (OUTPATIENT)
Dept: HEMATOLOGY ONCOLOGY | Facility: MEDICAL CENTER | Age: 58
End: 2018-09-27

## 2018-09-27 NOTE — TELEPHONE ENCOUNTER
Patient cancel appointment due to no transportation. she is unable to reschedule at moment and stated will give us a call tomorrow to schedule if she figures out transportation.

## 2018-12-19 DIAGNOSIS — Z17.0 MALIGNANT NEOPLASM OF UPPER-OUTER QUADRANT OF LEFT BREAST IN FEMALE, ESTROGEN RECEPTOR POSITIVE (HCC): ICD-10-CM

## 2018-12-19 DIAGNOSIS — C50.412 MALIGNANT NEOPLASM OF UPPER-OUTER QUADRANT OF LEFT BREAST IN FEMALE, ESTROGEN RECEPTOR POSITIVE (HCC): ICD-10-CM

## 2018-12-19 RX ORDER — ANASTROZOLE 1 MG/1
1 TABLET ORAL
Qty: 30 TAB | Refills: 1 | Status: SHIPPED | OUTPATIENT
Start: 2018-12-19 | End: 2019-02-19 | Stop reason: SDUPTHER

## 2019-01-03 ENCOUNTER — OFFICE VISIT (OUTPATIENT)
Dept: HEMATOLOGY ONCOLOGY | Facility: MEDICAL CENTER | Age: 59
End: 2019-01-03
Payer: MEDICAID

## 2019-01-03 VITALS
DIASTOLIC BLOOD PRESSURE: 90 MMHG | TEMPERATURE: 96.6 F | RESPIRATION RATE: 16 BRPM | HEIGHT: 60 IN | BODY MASS INDEX: 34.3 KG/M2 | HEART RATE: 91 BPM | WEIGHT: 174.71 LBS | SYSTOLIC BLOOD PRESSURE: 132 MMHG | OXYGEN SATURATION: 95 %

## 2019-01-03 DIAGNOSIS — D48.62 NEOPLASM OF UNCERTAIN BEHAVIOR OF LEFT BREAST: ICD-10-CM

## 2019-01-03 PROCEDURE — 99214 OFFICE O/P EST MOD 30 MIN: CPT | Performed by: INTERNAL MEDICINE

## 2019-01-03 ASSESSMENT — PAIN SCALES - GENERAL: PAINLEVEL: NO PAIN

## 2019-01-03 NOTE — PROGRESS NOTES
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     History of presenting illness: Lupis Colunga  is a 57 y.o. year old female who is here for follow-up of her early stage breast carcinoma. She is status post partial mastectomy and negative sentinel lymph node dissection on 12/2016. The plan was to do DIEGO catheter placement. However, there was some complications and she ended up getting adjuvant radiation.     Postoperatively pathology confirmed Invasive grade 1 ductal carcinoma arising from solid papillary  carcinoma, ER/CA strongly positive, HER-2/jake negative and Ki-67 less than 10 % . She was started on Arimidex 3 months ago. She is tolerating Advair with no major complaints at this point. She had some initial mild hot flashes, which subsequently resolved. DEXA scan showed  normal bone density.  Interim history  -Is here for a one year follow-up.  Reports being compliant with Arimidex.  No significant aches or pains.  8/2018 mammogram done at [facility] negative      Past Medical History:      Past Medical History:   Diagnosis Date   • Cancer (CMS-HCC) (HCC) 2016    breast left   • Dental disorder 2016    dentures upper and lower   • Hypertension    • Hypothyroidism        Past surgical history:       Past Surgical History:   Procedure Laterality Date   • HARDWARE REMOVAL ORTHO Right 3/23/2018    Procedure: Right Wrist pin Removal;  Surgeon: Ghulam Kaiser M.D.;  Location: [facility];  Service: Orthopedics   • MASTECTOMY Left 12/5/2016    Procedure: MASTECTOMY partial  with wire localized ;  Surgeon: Skyler Patel M.D.;  Location: [facility];  Service:    • NODE BIOPSY SENTINEL Left 12/5/2016    Procedure: NODE BIOPSY SENTINEL axillary ;  Surgeon: Skyler Patel M.D.;  Location: [facility];  Service:    • AXILLARY NODE DISSECTION  Left 12/5/2016    Procedure: AXILLARY NODE DISSECTION/ kory spacer ;  Surgeon: Skyler Patel M.D.;  Location: SURGERY [facility];  Service:    • APPENDECTOMY LAPAROSCOPIC  11/13/2013    Performed by Casey Quevedo M.D. at SURGERY [facility]   • OTHER  1977    plastic surgery face post dog bite       Allergies:       Penicillins    Medications:         Current Outpatient Prescriptions   Medication Sig Dispense Refill   • anastrozole (ARIMIDEX) 1 MG Tab Take 1 Tab by mouth every day. 30 Tab 1   • levothyroxine (SYNTHROID) 75 MCG Tab Take 75 mcg by mouth Every morning on an empty stomach.     • aspirin EC (ECOTRIN) 81 MG Tablet Delayed Response Take 81 mg by mouth.     • enalapril (VASOTEC) 10 MG Tab Take 10 mg by mouth every day.     • simvastatin (ZOCOR) 20 MG Tab Take 20 mg by mouth every evening.       No current facility-administered medications for this visit.          Social History:     Social History     Social History   • Marital status: Legally      Spouse name: N/A   • Number of children: N/A   • Years of education: N/A     Occupational History   • Not on file.     Social History Main Topics   • Smoking status: Current Every Day Smoker     Years: 30.00   • Smokeless tobacco: Never Used      Comment: 1/2 PPD   • Alcohol use No      Comment: denies   • Drug use: Yes      Comment: weed   • Sexual activity: Not on file     Other Topics Concern   • Not on file     Social History Narrative   • No narrative on file       Family History:    No family history on file.    Review of Systems:  All other review of systems are negative except what was mentioned above in the HPI.    Constitutional: Negative for fever, chills, weight loss and malaise/fatigue.    HEENT: No new auditory or visual complaints. No sore throat and neck pain.     Respiratory: Negative for cough, sputum production, shortness of breath and wheezing.    Cardiovascular: Negative for chest pain, palpitations, orthopnea and leg  swelling.    Gastrointestinal: Negative for heartburn, nausea, vomiting and abdominal pain.    Genitourinary: Negative for dysuria, hematuria    Musculoskeletal: No new arthralgias or myalgias   Skin: Negative for rash and itching.    Neurological: Negative for focal weakness and headaches.    Endo/Heme/Allergies: No abnormal bleed/bruise.    Psychiatric/Behavioral: No new depression/anxiety.    Physical Exam:  Vitals: /90 (BP Location: Right arm, Patient Position: Sitting, BP Cuff Size: Adult)   Pulse 91   Temp 35.9 °C (96.6 °F) (Temporal)   Resp 16   Ht 1.524 m (5')   Wt 79.2 kg (174 lb 11.4 oz)   SpO2 95%   BMI 34.12 kg/m²      General: Not in acute distress, alert and oriented x 3  HEENT: No pallor, icterus. Oropharynx clear.   Neck: Supple, no palpable masses.  Lymph nodes: No palpable cervical, supraclavicular, axillary or inguinal lymphadenopathy.    CVS: regular rate and rhythm, no rubs or gallops  RESP: Clear to auscultate bilaterally, no wheezing or crackles.   ABD: Soft, non tender, non distended, positive bowel sounds, no palpable organomegaly  EXT: No edema or cyanosis  CNS: Alert and oriented x3, No focal deficits.  Skin- No rash  Breast-she has some erythema in the inframammary fold.  Well-healed left lumpectomy scar with no concerning mass or nodularity    Labs:   No visits with results within 1 Week(s) from this visit.   Latest known visit with results is:   Orders Only on 2016   Component Date Value Ref Range Status   • Report 2016    Final                    Value:[facility]    Test Date:  2016  Pt Name:    SPENCER INFANTE             [facility]  MRN:        [id number]                      Room:  Gender:     F                            Technician: SANDI  :        1960                   Requested By:CHRIS MELO  Order #:    [id number]                    Sera MD: Eleanor Sanchez MD    Measurements  Intervals                                 Axis  Rate:       80                           P:          57  GA:         184                          QRS:        78  QRSD:       86                           T:          63  QT:         360  QTc:        416    Interpretive Statements  SINUS RHYTHM  BORDERLINE T ABNORMALITIES, ANT-LAT LEADS  Compared to ECG 11/12/2013 13:01:33  T-wave abnormality now present  Sinus tachycardia no longer present  Inferior Q waves no longer present  Q waves no longer present    Electronically Signed On 11- 14:29:43 PST by Eleanor Sanchez MD     • WBC 11/28/2016 6.1  4.8 - 10.8 K/uL Final   • RBC 11/28/2016 5.37  4.20 - 5.40 M/uL Final   • Hemoglobin 11/28/2016 16.4* 12.0 - 16.0 g/dL Final   • Hematocrit 11/28/2016 49.8* 37.0 - 47.0 % Final   • MCV 11/28/2016 92.7  81.4 - 97.8 fL Final   • MCH 11/28/2016 30.5  27.0 - 33.0 pg Final   • MCHC 11/28/2016 32.9* 33.6 - 35.0 g/dL Final   • RDW 11/28/2016 43.3  35.9 - 50.0 fL Final   • Platelet Count 11/28/2016 194  164 - 446 K/uL Final   • MPV 11/28/2016 10.5  9.0 - 12.9 fL Final   • Neutrophils-Polys 11/28/2016 57.10  44.00 - 72.00 % Final   • Lymphocytes 11/28/2016 34.00  22.00 - 41.00 % Final   • Monocytes 11/28/2016 6.80  0.00 - 13.40 % Final   • Eosinophils 11/28/2016 0.70  0.00 - 6.90 % Final   • Basophils 11/28/2016 0.70  0.00 - 1.80 % Final   • Immature Granulocytes 11/28/2016 0.70  0.00 - 0.90 % Final   • Nucleated RBC 11/28/2016 0.00  /100 WBC Final   • Neutrophils (Absolute) 11/28/2016 3.46  2.00 - 7.15 K/uL Final    Includes immature neutrophils, if present.   • Lymphs (Absolute) 11/28/2016 2.06  1.00 - 4.80 K/uL Final   • Monos (Absolute) 11/28/2016 0.41  0.00 - 0.85 K/uL Final   • Eos (Absolute) 11/28/2016 0.04  0.00 - 0.51 K/uL Final   • Baso (Absolute) 11/28/2016 0.04  0.00 - 0.12 K/uL Final   • Immature Granulocytes (abs) 11/28/2016 0.04  0.00 - 0.11 K/uL Final   • NRBC (Absolute) 11/28/2016 0.00  K/uL Final   • Sodium 11/28/2016 138  135 - 145 mmol/L Final   •  Potassium 11/28/2016 4.0  3.6 - 5.5 mmol/L Final   • Chloride 11/28/2016 105  96 - 112 mmol/L Final   • Co2 11/28/2016 27  20 - 33 mmol/L Final   • Anion Gap 11/28/2016 6.0  0.0 - 11.9 Final   • Glucose 11/28/2016 89  65 - 99 mg/dL Final   • Bun 11/28/2016 14  8 - 22 mg/dL Final   • Creatinine 11/28/2016 0.75  0.50 - 1.40 mg/dL Final   • Calcium 11/28/2016 10.5  8.5 - 10.5 mg/dL Final   • AST(SGOT) 11/28/2016 20  12 - 45 U/L Final   • ALT(SGPT) 11/28/2016 25  2 - 50 U/L Final   • Alkaline Phosphatase 11/28/2016 87  30 - 99 U/L Final   • Total Bilirubin 11/28/2016 0.6  0.1 - 1.5 mg/dL Final   • Albumin 11/28/2016 4.2  3.2 - 4.9 g/dL Final   • Total Protein 11/28/2016 7.5  6.0 - 8.2 g/dL Final   • Globulin 11/28/2016 3.3  1.9 - 3.5 g/dL Final   • A-G Ratio 11/28/2016 1.3  g/dL Final   • GFR If  11/28/2016 >60  >60 mL/min/1.73 m 2 Final   • GFR If Non  11/28/2016 >60  >60 mL/min/1.73 m 2 Final         Assessment and Plan:  Stage IA (T1mic N0 M0) 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 She is status post adjuvant radiation. She was started on Arimidex on 5/2017, which she is tolerating well so far.   Baseline Bone density 2017 was normal. Informed her about the potential for Arimidex to cause some osteopenia and instructed to start over-the-counter calcium. Vitamin D.    Return to clinic in 6 months with follow-up mammogram and DEXA scan      She agreed and verbalized  agreement and understanding with the current plan.  I answered all questions and concerns at this time         Please note that this dictation was created using voice recognition software. I have made every reasonable attempt to correct obvious errors, but I expect that there are errors of grammar and possibly content that I did not discover before finalizing the note.      SIGNATURES:  Caesar Kahn    CC:  Mohsen Tamasaby,  M.D.  No ref. provider found

## 2019-02-19 DIAGNOSIS — C50.412 MALIGNANT NEOPLASM OF UPPER-OUTER QUADRANT OF LEFT BREAST IN FEMALE, ESTROGEN RECEPTOR POSITIVE (HCC): ICD-10-CM

## 2019-02-19 DIAGNOSIS — Z17.0 MALIGNANT NEOPLASM OF UPPER-OUTER QUADRANT OF LEFT BREAST IN FEMALE, ESTROGEN RECEPTOR POSITIVE (HCC): ICD-10-CM

## 2019-02-19 RX ORDER — ANASTROZOLE 1 MG/1
1 TABLET ORAL
Qty: 90 TAB | Refills: 1 | Status: SHIPPED | OUTPATIENT
Start: 2019-02-19 | End: 2019-08-17 | Stop reason: SDUPTHER

## 2019-02-19 NOTE — TELEPHONE ENCOUNTER
Was the patient seen in the last year in this department? Yes    Does patient have an active prescription for medications requested? No     Received Request Via: Patient  Anastrozole

## 2019-05-09 ENCOUNTER — TELEPHONE (OUTPATIENT)
Dept: HEMATOLOGY ONCOLOGY | Facility: MEDICAL CENTER | Age: 59
End: 2019-05-09

## 2019-05-09 NOTE — TELEPHONE ENCOUNTER
Called patient in follow up to letter sent dated 03/25/19 informing patient that  is leaving [facility].    No answer, no ability to leave a message. Will try to call again at a later time

## 2019-05-14 ENCOUNTER — TELEPHONE (OUTPATIENT)
Dept: HEMATOLOGY ONCOLOGY | Facility: MEDICAL CENTER | Age: 59
End: 2019-05-14

## 2019-05-14 ENCOUNTER — HOSPITAL ENCOUNTER (OUTPATIENT)
Dept: RADIOLOGY | Facility: MEDICAL CENTER | Age: 59
End: 2019-05-14
Attending: INTERNAL MEDICINE
Payer: MEDICAID

## 2019-05-14 DIAGNOSIS — Z12.31 SCREENING MAMMOGRAM, ENCOUNTER FOR: ICD-10-CM

## 2019-05-14 DIAGNOSIS — D48.62 NEOPLASM OF UNCERTAIN BEHAVIOR OF LEFT BREAST: ICD-10-CM

## 2019-05-14 PROCEDURE — 77080 DXA BONE DENSITY AXIAL: CPT

## 2019-05-14 PROCEDURE — 77063 BREAST TOMOSYNTHESIS BI: CPT

## 2019-05-14 NOTE — TELEPHONE ENCOUNTER
Called the patient in follow up to letter sent dated 3/25/19 informing patient that Dr. Kahn is leaving [facility].  Scheduled the patient to see interim provider, Dr. Marlys Wyman, in [facility] to continue care.

## 2019-07-10 NOTE — PROGRESS NOTES
07/10/19    Subjective    Chief Complaint:  59 female f/u left T1bN0 ER/OR(+) low Ki67 breast cancer.    HPI:  12/2016 lumpectomy -> XRT and on Arimidex grade 1 papillary carcinoma Ki67 < 10%.  Dexa in 5/2019 some osteopenia. Mammogram 5/14/2019 negative. Very upset because 35 yo son being referred to hospice for Crohns. Can't get appt with GI for 2 months.     ROS:Negative for head, eyes, lung, cardiac or GI issues.    PMH:    Allergies   Allergen Reactions   • Penicillins Anaphylaxis     Tolerates cephalosporins; blisters       Medications:    Current Outpatient Prescriptions on File Prior to Visit   Medication Sig Dispense Refill   • anastrozole (ARIMIDEX) 1 MG Tab Take 1 Tab by mouth every day. 90 Tab 1   • levothyroxine (SYNTHROID) 75 MCG Tab Take 75 mcg by mouth Every morning on an empty stomach.     • aspirin EC (ECOTRIN) 81 MG Tablet Delayed Response Take 81 mg by mouth.     • enalapril (VASOTEC) 10 MG Tab Take 10 mg by mouth every day.     • simvastatin (ZOCOR) 20 MG Tab Take 20 mg by mouth every evening.       No current facility-administered medications on file prior to visit.          Objective    Vitals:    /98 (BP Location: Right arm, Patient Position: Sitting, BP Cuff Size: Adult)   Pulse 100   Temp 37.1 °C (98.7 °F) (Temporal)   Resp 18   Ht 1.524 m (5')   Wt 76.9 kg (169 lb 8.5 oz)   SpO2 96%   BMI 33.11 kg/m²     Physical Exam: Obese, tearful WN NAD    HEENT - PERRL  Neck - supple  Node - none  Chest - clear  Breasts - scar left lower outer breast. No mass.  COR - RSR no murmur  Abd - Obese. ? Small umbilical hernia. No liver, spleen palpable  Ext - No edema  Skin - no rash or jaundice    Labs:  none    Assessment    Imp:    Visit Diagnosis:    1. Malignant neoplasm of lower-outer quadrant of left breast of female, estrogen receptor positive (HCC)  MA-SCREENING MAMMO BILAT W/TOMOSYNTHESIS W/CAD         Plan:    RTC 6 mo for mammogram  Call [facility] and try to get earlier appt for  ojse l Ordonez M.D.

## 2019-07-11 ENCOUNTER — OFFICE VISIT (OUTPATIENT)
Dept: HEMATOLOGY ONCOLOGY | Facility: MEDICAL CENTER | Age: 59
End: 2019-07-11
Payer: MEDICAID

## 2019-07-11 VITALS
DIASTOLIC BLOOD PRESSURE: 98 MMHG | TEMPERATURE: 98.7 F | SYSTOLIC BLOOD PRESSURE: 158 MMHG | HEART RATE: 100 BPM | HEIGHT: 60 IN | BODY MASS INDEX: 33.28 KG/M2 | RESPIRATION RATE: 18 BRPM | OXYGEN SATURATION: 96 % | WEIGHT: 169.53 LBS

## 2019-07-11 DIAGNOSIS — C50.512 MALIGNANT NEOPLASM OF LOWER-OUTER QUADRANT OF LEFT BREAST OF FEMALE, ESTROGEN RECEPTOR POSITIVE (HCC): ICD-10-CM

## 2019-07-11 DIAGNOSIS — Z17.0 MALIGNANT NEOPLASM OF LOWER-OUTER QUADRANT OF LEFT BREAST OF FEMALE, ESTROGEN RECEPTOR POSITIVE (HCC): ICD-10-CM

## 2019-07-11 PROCEDURE — 99213 OFFICE O/P EST LOW 20 MIN: CPT | Performed by: INTERNAL MEDICINE

## 2019-07-11 ASSESSMENT — PAIN SCALES - GENERAL: PAINLEVEL: NO PAIN

## 2019-08-17 DIAGNOSIS — Z17.0 MALIGNANT NEOPLASM OF UPPER-OUTER QUADRANT OF LEFT BREAST IN FEMALE, ESTROGEN RECEPTOR POSITIVE (HCC): ICD-10-CM

## 2019-08-17 DIAGNOSIS — C50.512 MALIGNANT NEOPLASM OF LOWER-OUTER QUADRANT OF LEFT BREAST OF FEMALE, ESTROGEN RECEPTOR POSITIVE (HCC): ICD-10-CM

## 2019-08-17 DIAGNOSIS — C50.412 MALIGNANT NEOPLASM OF UPPER-OUTER QUADRANT OF LEFT BREAST IN FEMALE, ESTROGEN RECEPTOR POSITIVE (HCC): ICD-10-CM

## 2019-08-17 DIAGNOSIS — Z17.0 MALIGNANT NEOPLASM OF LOWER-OUTER QUADRANT OF LEFT BREAST OF FEMALE, ESTROGEN RECEPTOR POSITIVE (HCC): ICD-10-CM

## 2019-08-19 RX ORDER — ANASTROZOLE 1 MG/1
TABLET ORAL
Qty: 90 TAB | Refills: 1 | Status: SHIPPED | OUTPATIENT
Start: 2019-08-19 | End: 2020-02-10

## 2019-08-19 NOTE — TELEPHONE ENCOUNTER
Was the patient seen in the last year in this department? Yes    Does patient have an active prescription for medications requested? No     Received Request Via: Pharmacy     Patient established care with Dr. Ordonez on 7/11/19

## 2020-01-13 ENCOUNTER — OFFICE VISIT (OUTPATIENT)
Dept: HEMATOLOGY ONCOLOGY | Facility: MEDICAL CENTER | Age: 60
End: 2020-01-13
Payer: MEDICAID

## 2020-01-13 VITALS
HEIGHT: 59 IN | WEIGHT: 163.91 LBS | SYSTOLIC BLOOD PRESSURE: 122 MMHG | TEMPERATURE: 97.3 F | OXYGEN SATURATION: 94 % | BODY MASS INDEX: 33.04 KG/M2 | RESPIRATION RATE: 20 BRPM | HEART RATE: 100 BPM | DIASTOLIC BLOOD PRESSURE: 80 MMHG

## 2020-01-13 DIAGNOSIS — M85.88 OSTEOPENIA OF LUMBAR SPINE: ICD-10-CM

## 2020-01-13 DIAGNOSIS — Z79.811 AROMATASE INHIBITOR USE: ICD-10-CM

## 2020-01-13 DIAGNOSIS — Z17.0 MALIGNANT NEOPLASM OF CENTRAL PORTION OF LEFT BREAST IN FEMALE, ESTROGEN RECEPTOR POSITIVE (HCC): ICD-10-CM

## 2020-01-13 DIAGNOSIS — C50.112 MALIGNANT NEOPLASM OF CENTRAL PORTION OF LEFT BREAST IN FEMALE, ESTROGEN RECEPTOR POSITIVE (HCC): ICD-10-CM

## 2020-01-13 PROCEDURE — 99213 OFFICE O/P EST LOW 20 MIN: CPT | Performed by: NURSE PRACTITIONER

## 2020-01-13 RX ORDER — ENALAPRIL MALEATE 10 MG/1
10 TABLET ORAL
COMMUNITY
Start: 2015-10-28 | End: 2021-06-28

## 2020-01-13 RX ORDER — ATORVASTATIN CALCIUM 40 MG/1
40 TABLET, FILM COATED ORAL
COMMUNITY
Start: 2015-10-28

## 2020-01-13 ASSESSMENT — ENCOUNTER SYMPTOMS
DIARRHEA: 0
FEVER: 0
NECK PAIN: 0
WHEEZING: 0
SHORTNESS OF BREATH: 0
DIAPHORESIS: 0
HEMOPTYSIS: 0
HEADACHES: 0
BACK PAIN: 0
DIZZINESS: 0
COUGH: 1
VOMITING: 0
NAUSEA: 0
WEIGHT LOSS: 1
SPUTUM PRODUCTION: 0
PALPITATIONS: 0
MYALGIAS: 0
CHILLS: 0
CONSTIPATION: 0

## 2020-01-13 ASSESSMENT — PAIN SCALES - GENERAL: PAINLEVEL: NO PAIN

## 2020-01-13 NOTE — PROGRESS NOTES
Subjective:      Lupis Colunga is a 59 y.o. female who presents for 6 month follow up for Breast Cancer.        HPI    Patient seen today in follow-up for T1bN0 ER/WV +, ki-67 < 10%, grade 1 papillary breast cancer.  She presents unaccompanied for today's visit.    Patient underwent a lumpectomy December 2016 followed by radiation therapy and aromatase inhibitor.  Patient was diagnosed with grade 1 papillary carcinoma.  Patient is currently on anastrozole and tolerating it well.    Patient is being seen today for 6-month follow-up visit.  She recently has a cold x1 week with very mild sputum production.  She has some increased fatigue related to the cough but otherwise she states she is doing very well.  She denies any fevers or chills associated with the cough.  Her appetite is been stable.  She has lost some weight since last seen 6 months prior.  She denies any hot flashes or generalized myalgias.  She did have previous wrist surgery and states that she does have some ache of the wrist during the cold weather.  Otherwise clinically she is doing very well.    Patient stated that she believes that she was bit by a bug approximately 3 months ago which is left a scar of the right breast.  There is no itching, redness just slight skin change.    Last mammogram was May 14, 2019 which showed no radiographic evidence of malignancy.  Last DEXA scan was May 14, 2019 which showed osteopenia of the spine and normal bone density of the left femur.  Did confirm with patient that she is currently taking vitamin D and calcium supplements daily.    Allergies   Allergen Reactions   • Penicillins Anaphylaxis     Tolerates cephalosporins; blisters     Current Outpatient Medications on File Prior to Visit   Medication Sig Dispense Refill   • atorvastatin (LIPITOR) 40 MG Tab Take 40 mg by mouth.     • enalapril (VASOTEC) 10 MG Tab Take 10 mg by mouth.     • anastrozole (ARIMIDEX) 1 MG Tab TAKE 1 TABLET BY MOUTH EVERY DAY 90 Tab  "1   • levothyroxine (SYNTHROID) 75 MCG Tab Take 75 mcg by mouth Every morning on an empty stomach.     • aspirin EC (ECOTRIN) 81 MG Tablet Delayed Response Take 81 mg by mouth.     • enalapril (VASOTEC) 10 MG Tab Take 10 mg by mouth every day.     • simvastatin (ZOCOR) 20 MG Tab Take 20 mg by mouth every evening.       No current facility-administered medications on file prior to visit.          Review of Systems   Constitutional: Positive for malaise/fatigue (very mild likely related to cough) and weight loss. Negative for chills, diaphoresis and fever.   Respiratory: Positive for cough. Negative for hemoptysis, sputum production, shortness of breath and wheezing.    Cardiovascular: Negative for chest pain and palpitations.   Gastrointestinal: Negative for constipation, diarrhea, nausea and vomiting.   Genitourinary: Negative for dysuria.   Musculoskeletal: Negative for back pain, joint pain (wrist pain during the cold weather from previous surgery), myalgias and neck pain.   Skin: Negative for itching and rash.   Neurological: Negative for dizziness and headaches.          Objective:     /80 (BP Location: Right arm, Patient Position: Sitting, BP Cuff Size: Adult)   Pulse 100   Temp 36.3 °C (97.3 °F) (Temporal)   Resp 20   Ht 1.499 m (4' 11\")   Wt 74.3 kg (163 lb 14.6 oz)   SpO2 94%   BMI 33.11 kg/m²      Physical Exam  Vitals signs reviewed.   Constitutional:       General: She is not in acute distress.     Appearance: Normal appearance. She is not diaphoretic.   HENT:      Head: Normocephalic and atraumatic.   Neck:      Musculoskeletal: Normal range of motion.   Cardiovascular:      Rate and Rhythm: Normal rate and regular rhythm.      Pulses: Normal pulses.      Heart sounds: Normal heart sounds. No murmur. No friction rub. No gallop.    Pulmonary:      Effort: Pulmonary effort is normal. No respiratory distress.      Breath sounds: No wheezing.      Comments: Slight diminished in the upper " lobes  Chest:      Breasts:         Right: No swelling, bleeding, inverted nipple, mass, nipple discharge, skin change or tenderness.         Left: No swelling, bleeding, inverted nipple, mass, nipple discharge, skin change or tenderness.      Comments: Left breast noted to lumpectomy location. Right breast with scar noted from what patient believes to be a previous bug bite.  Abdominal:      General: Abdomen is flat. Bowel sounds are normal. There is no distension.      Palpations: Abdomen is soft.      Tenderness: There is no tenderness.   Musculoskeletal: Normal range of motion.         General: No swelling or tenderness.   Lymphadenopathy:      Cervical: No cervical adenopathy.   Skin:     General: Skin is warm and dry.   Neurological:      General: No focal deficit present.      Mental Status: She is alert and oriented to person, place, and time.   Psychiatric:         Mood and Affect: Mood normal.         Behavior: Behavior normal.                 Assessment/Plan:       1. Malignant neoplasm of central portion of left breast in female, estrogen receptor positive (HCC)  MA-MAMMO DIAGNOSTIC BILAT W/ARIES W/CAD   2. Osteopenia of lumbar spine     3. Aromatase inhibitor use       Plan  1. Patient with T1bN0 ER/ND +, ki-67 < 10%, grade 1 papillary breast cancer.  Patient currently on aromatase inhibitor employing anastrozole and tolerating well.  She was started on this medication around January/February 2017.  Recommendation for patient to complete 5 full years of treatment.  She is tolerating it very well.    Last mammogram was May 14, 2019 which showed no evidence of malignancy.  She will be due for her annual diagnostic mammogram this coming May and it has been ordered and scheduled.    2.  Patient with mild osteopenia noted at the spine on last DEXA scan.  She currently is taking vitamin D and calcium supplements and she is to continue daily.  Last DEXA scan was May 14, 2019.  She will be due in 2 years which  will be approximately May 2021.    3.  Patient to follow-up in the clinic in 6 months or sooner if needed.

## 2020-02-10 DIAGNOSIS — C50.512 MALIGNANT NEOPLASM OF LOWER-OUTER QUADRANT OF LEFT BREAST OF FEMALE, ESTROGEN RECEPTOR POSITIVE (HCC): ICD-10-CM

## 2020-02-10 DIAGNOSIS — Z17.0 MALIGNANT NEOPLASM OF LOWER-OUTER QUADRANT OF LEFT BREAST OF FEMALE, ESTROGEN RECEPTOR POSITIVE (HCC): ICD-10-CM

## 2020-02-10 RX ORDER — ANASTROZOLE 1 MG/1
TABLET ORAL
Qty: 90 TAB | Refills: 1 | Status: SHIPPED | OUTPATIENT
Start: 2020-02-10 | End: 2020-07-31

## 2020-06-02 ENCOUNTER — HOSPITAL ENCOUNTER (OUTPATIENT)
Dept: RADIOLOGY | Facility: MEDICAL CENTER | Age: 60
End: 2020-06-02
Attending: NURSE PRACTITIONER
Payer: MEDICAID

## 2020-06-02 ENCOUNTER — HOSPITAL ENCOUNTER (OUTPATIENT)
Dept: RADIOLOGY | Facility: MEDICAL CENTER | Age: 60
End: 2020-06-02
Payer: MEDICAID

## 2020-06-02 ENCOUNTER — TELEPHONE (OUTPATIENT)
Dept: HEMATOLOGY ONCOLOGY | Facility: MEDICAL CENTER | Age: 60
End: 2020-06-02

## 2020-06-02 DIAGNOSIS — C50.112 MALIGNANT NEOPLASM OF CENTRAL PORTION OF LEFT BREAST IN FEMALE, ESTROGEN RECEPTOR POSITIVE (HCC): ICD-10-CM

## 2020-06-02 DIAGNOSIS — Z17.0 MALIGNANT NEOPLASM OF CENTRAL PORTION OF LEFT BREAST IN FEMALE, ESTROGEN RECEPTOR POSITIVE (HCC): ICD-10-CM

## 2020-06-02 PROCEDURE — G0279 TOMOSYNTHESIS, MAMMO: HCPCS

## 2020-06-02 NOTE — TELEPHONE ENCOUNTER
----- Message from PELON ApplePMASOUD sent at 6/2/2020 11:47 AM PDT -----  Can you let patient know that mammo looks good and I will discuss with her more at her upcoming visit in July  ----- Message -----  From: Angella Roach In  Sent: 6/2/2020  11:41 AM PDT  To: PELON ApplePMASOUD

## 2020-07-14 ENCOUNTER — APPOINTMENT (OUTPATIENT)
Dept: HEMATOLOGY ONCOLOGY | Facility: MEDICAL CENTER | Age: 60
End: 2020-07-14
Payer: MEDICAID

## 2020-11-24 ENCOUNTER — OFFICE VISIT (OUTPATIENT)
Dept: HEMATOLOGY ONCOLOGY | Facility: MEDICAL CENTER | Age: 60
End: 2020-11-24
Payer: MEDICAID

## 2020-11-24 VITALS
HEIGHT: 59 IN | BODY MASS INDEX: 32.89 KG/M2 | SYSTOLIC BLOOD PRESSURE: 124 MMHG | OXYGEN SATURATION: 96 % | TEMPERATURE: 98.3 F | HEART RATE: 120 BPM | DIASTOLIC BLOOD PRESSURE: 82 MMHG | WEIGHT: 163.14 LBS | RESPIRATION RATE: 14 BRPM

## 2020-11-24 DIAGNOSIS — M85.88 OSTEOPENIA OF SPINE: ICD-10-CM

## 2020-11-24 DIAGNOSIS — Z17.0 MALIGNANT NEOPLASM OF CENTRAL PORTION OF LEFT BREAST IN FEMALE, ESTROGEN RECEPTOR POSITIVE (HCC): ICD-10-CM

## 2020-11-24 DIAGNOSIS — C50.112 MALIGNANT NEOPLASM OF CENTRAL PORTION OF LEFT BREAST IN FEMALE, ESTROGEN RECEPTOR POSITIVE (HCC): ICD-10-CM

## 2020-11-24 DIAGNOSIS — Z79.811 AROMATASE INHIBITOR USE: ICD-10-CM

## 2020-11-24 DIAGNOSIS — Z91.89 AT HIGH RISK FOR OSTEOPOROSIS: ICD-10-CM

## 2020-11-24 PROCEDURE — 99214 OFFICE O/P EST MOD 30 MIN: CPT | Performed by: NURSE PRACTITIONER

## 2020-11-24 ASSESSMENT — ENCOUNTER SYMPTOMS
NAUSEA: 0
DIARRHEA: 0
INSOMNIA: 0
CHILLS: 0
WHEEZING: 0
DIAPHORESIS: 0
SHORTNESS OF BREATH: 0
PALPITATIONS: 0
DIZZINESS: 0
CONSTIPATION: 0
HEADACHES: 0
WEIGHT LOSS: 0
NERVOUS/ANXIOUS: 0
TINGLING: 0
COUGH: 0
DEPRESSION: 0
VOMITING: 0
SORE THROAT: 0
MYALGIAS: 0
FEVER: 0

## 2020-11-24 ASSESSMENT — PATIENT HEALTH QUESTIONNAIRE - PHQ9
CLINICAL INTERPRETATION OF PHQ2 SCORE: 1
SUM OF ALL RESPONSES TO PHQ QUESTIONS 1-9: 2
5. POOR APPETITE OR OVEREATING: 0 - NOT AT ALL

## 2021-03-15 DIAGNOSIS — Z23 NEED FOR VACCINATION: ICD-10-CM

## 2021-06-02 ENCOUNTER — APPOINTMENT (OUTPATIENT)
Dept: RADIOLOGY | Facility: MEDICAL CENTER | Age: 61
End: 2021-06-02
Attending: NURSE PRACTITIONER
Payer: MEDICAID

## 2021-06-22 ENCOUNTER — HOSPITAL ENCOUNTER (OUTPATIENT)
Dept: RADIOLOGY | Facility: MEDICAL CENTER | Age: 61
End: 2021-06-22
Attending: NURSE PRACTITIONER
Payer: MEDICAID

## 2021-06-22 DIAGNOSIS — Z91.89 AT HIGH RISK FOR OSTEOPOROSIS: ICD-10-CM

## 2021-06-22 DIAGNOSIS — M85.88 OSTEOPENIA OF SPINE: ICD-10-CM

## 2021-06-22 DIAGNOSIS — Z17.0 MALIGNANT NEOPLASM OF CENTRAL PORTION OF LEFT BREAST IN FEMALE, ESTROGEN RECEPTOR POSITIVE (HCC): ICD-10-CM

## 2021-06-22 DIAGNOSIS — C50.112 MALIGNANT NEOPLASM OF CENTRAL PORTION OF LEFT BREAST IN FEMALE, ESTROGEN RECEPTOR POSITIVE (HCC): ICD-10-CM

## 2021-06-22 DIAGNOSIS — Z79.811 AROMATASE INHIBITOR USE: ICD-10-CM

## 2021-06-22 PROCEDURE — G0279 TOMOSYNTHESIS, MAMMO: HCPCS

## 2021-06-22 PROCEDURE — 77080 DXA BONE DENSITY AXIAL: CPT

## 2021-06-24 NOTE — PROGRESS NOTES
Subjective:      Lupis Colunga is a 61 y.o. female who presents for 6 month follow up for  Breast Cancer.        HPI    Patient seen today in follow-up for T1bN0 ER/MI +, ki-67 < 10%, grade 1 papillary breast cancer.  She presents accompanied by her son for today's visit.     Patient underwent a lumpectomy December 2016 followed by radiation therapy and aromatase inhibitor.  Patient was diagnosed with grade 1 papillary carcinoma.  Patient is currently on anastrozole, initiated 05/2017,  and tolerating it well.    Clinically patient is doing very well.  She tolerates her Arimidex well.  She denies any hot flashes.  She denies any changes to her breast tissue.  Patient did note to have a rash under the right breast, skin of the abdomen that appears to be a heat rash.  She stated has been present for approximately 2 months and not changing in size or color.  It is not raised.  He does have some mild itching at times.  She has not used any medication, or topical agent for this.  Patient recently completed her mammogram and bone mineral density which I did review both with her in detail today.      Allergies   Allergen Reactions   • Penicillins Anaphylaxis     Tolerates cephalosporins; blisters     Current Outpatient Medications on File Prior to Visit   Medication Sig Dispense Refill   • anastrozole (ARIMIDEX) 1 MG Tab TAKE 1 TABLET BY MOUTH EVERY DAY 90 Tab 1   • atorvastatin (LIPITOR) 40 MG Tab Take 40 mg by mouth.     • levothyroxine (SYNTHROID) 75 MCG Tab Take 75 mcg by mouth Every morning on an empty stomach.     • aspirin EC (ECOTRIN) 81 MG Tablet Delayed Response Take 81 mg by mouth.     • enalapril (VASOTEC) 10 MG Tab Take 10 mg by mouth every day.     • simvastatin (ZOCOR) 20 MG Tab Take 20 mg by mouth every evening.     • enalapril (VASOTEC) 10 MG Tab Take 10 mg by mouth. (Patient not taking: Reported on 6/28/2021)       No current facility-administered medications on file prior to visit.       Review of  "Systems   Constitutional: Negative for chills, diaphoresis, fever, malaise/fatigue and weight loss.   Respiratory: Negative for cough, shortness of breath and wheezing.    Cardiovascular: Negative for chest pain and palpitations.   Gastrointestinal: Negative for constipation, diarrhea, nausea and vomiting.   Genitourinary: Negative for dysuria.   Musculoskeletal: Negative for myalgias.   Skin: Positive for itching (sometimes on the skin under the right breast).   Neurological: Negative for dizziness and headaches.          Objective:     /62   Pulse (!) 102   Temp 36.9 °C (98.4 °F) (Temporal)   Resp 16   Ht 1.485 m (4' 10.47\")   Wt 69.8 kg (153 lb 15.9 oz)   SpO2 98%   BMI 31.67 kg/m²      Physical Exam  Vitals reviewed.   Constitutional:       General: She is not in acute distress.     Appearance: Normal appearance. She is not diaphoretic.   HENT:      Head: Normocephalic and atraumatic.   Cardiovascular:      Rate and Rhythm: Regular rhythm. Tachycardia present.      Pulses: Normal pulses.      Heart sounds: Normal heart sounds. No murmur heard.   No friction rub. No gallop.    Pulmonary:      Effort: Pulmonary effort is normal. No respiratory distress.      Breath sounds: No wheezing.      Comments: Diminished throughout  Abdominal:      General: Bowel sounds are normal. There is no distension.      Palpations: Abdomen is soft.      Tenderness: There is no abdominal tenderness.   Musculoskeletal:         General: No swelling or tenderness. Normal range of motion.   Lymphadenopathy:      Cervical: No cervical adenopathy.   Neurological:      Mental Status: She is alert and oriented to person, place, and time.   Psychiatric:         Mood and Affect: Mood normal.         Behavior: Behavior normal.          DS-BONE DENSITY STUDY (DEXA)    Result Date: 6/22/2021 6/22/2021 10:10 AM HISTORY/REASON FOR EXAM:  History of left breast cancer - osteopenic L1-L3 levels - at risk for osteoporosis d/t aromatase " inhibitor use, tobacco use, postmenopausal. TECHNIQUE/EXAM DESCRIPTION AND NUMBER OF VIEWS: Dual x-ray bone densitometry was performed from the L1 through L3 levels and from the proximal left femur utilizing the GE Prodigy unit. COMPARISON:   Bone densitometry scan 5/14/2019. FINDINGS: The mean bone mineral density for the lumbar spine is 0.934 g/cm2, which corresponds to a T score of -2.0 and a Z score of -0.8. The proximal left femur has a mean bone mineral density of 0.883 g/cm2, with a T score of -1.0 and a Z score of -0.1. When compared with the most recent study dated 5/14/2019, there has been a 2.4% decrease in the bone mineral density of the lumbar spine and a 8.0% decrease in the bone mineral density of the proximal left femur.     According to the World Health Organization classification, bone mineral density of this patient is osteopenia with increased fracture risk for the lumbar spine and left femur. 10-year Probability of Fracture: Major Osteoporotic     12.8% Hip     3.9% Population      [place] () Based on left femur neck BMD INTERPRETING LOCATION:  [address] [place] [address], [id number]    MA-DIAGNOSTIC MAMMO BILAT W/TOMOSYNTHESIS W/CAD    Result Date: 6/22/2021 6/22/2021 10:13 AM HISTORY/REASON FOR EXAM:  History of left breast carcinoma. TECHNIQUE/EXAM DESCRIPTION AND NUMBER OF VIEWS: Bilateral tomosynthesis diagnostic mammography was performed with standard mammographic images generated from the data set. Images were reviewed and interpreted with CAD. COMPARISON:   6/2/2020, 5/14/2019, 8/30/2018, 10/3/2017 FINDINGS: There are scattered areas of fibroglandular density. Left breast: No spiculation or suspicious calcification has developed. No significant postoperative change is identified. No abnormality is identified on the tomosynthesis images. Right breast: No spiculation or suspicious calcification has developed. No abnormality is identified on the tomosynthesis images.     1.  No radiographic  evidence of malignancy bilaterally. 2.  Bilateral annual follow-up mammography is recommended. These results were given to the patient at the time of visit. R1 - Category 1:  Negative         Assessment/Plan:       1. Malignant neoplasm of lower-outer quadrant of left breast of female, estrogen receptor positive (HCC)     2. Aromatase inhibitor use     3. At high risk for osteoporosis     4. Osteopenia of multiple sites     5. Rash         1. Patient with T1bN0 ER/CO +, ki-67 < 10%, grade 1 papillary breast cancer, currently on anastrozole.  She was started on anastrozole in May 2017, plan to complete 5 years of treatment, due to complete in May 2022.  Patient is tolerating anastrozole well.    Most recent mammogram shows no radiographic evidence of malignancy.  Recommend continued annual imaging which will be due next June 2022.  Mammogram can be ordered at next follow-up visit in 6 months.    2.  Patient is at a higher risk for osteoporosis.  She does have osteopenia of the spine and now noted in the femur on most recent bone mineral density on 6/22/21. She is currently taking vitamin D and calcium supplements.    Discussed with patient that she should be taking 1200 mg of calcium and 1000 units of vitamin D, and she does plan to go home and check her bottle as she is taking a combo over-the-counter generic brand vitamin D/calcium supplement.  Patient will be due for follow-up bone mineral density in 2 years, June 2023.    3.  Patient noticed a rash under the right breast skin.  She stated it has been present for approximately 2 months.  It is not getting any bigger, changing or tender.  There is some mild itching at times.  Recommend she try powder to keep that area dry as this appears to be potential heat rash.  If this does not improve she is to follow-up with her PCP for further evaluation.    4. Patient to follow up in the clinic in 6 months or sooner if needed.

## 2021-06-28 ENCOUNTER — OFFICE VISIT (OUTPATIENT)
Dept: HEMATOLOGY ONCOLOGY | Facility: MEDICAL CENTER | Age: 61
End: 2021-06-28
Payer: MEDICAID

## 2021-06-28 VITALS
TEMPERATURE: 98.4 F | HEIGHT: 58 IN | OXYGEN SATURATION: 98 % | RESPIRATION RATE: 16 BRPM | DIASTOLIC BLOOD PRESSURE: 62 MMHG | BODY MASS INDEX: 32.32 KG/M2 | SYSTOLIC BLOOD PRESSURE: 112 MMHG | WEIGHT: 153.99 LBS | HEART RATE: 102 BPM

## 2021-06-28 DIAGNOSIS — Z91.89 AT HIGH RISK FOR OSTEOPOROSIS: ICD-10-CM

## 2021-06-28 DIAGNOSIS — Z17.0 MALIGNANT NEOPLASM OF LOWER-OUTER QUADRANT OF LEFT BREAST OF FEMALE, ESTROGEN RECEPTOR POSITIVE (HCC): ICD-10-CM

## 2021-06-28 DIAGNOSIS — Z79.811 AROMATASE INHIBITOR USE: ICD-10-CM

## 2021-06-28 DIAGNOSIS — C50.512 MALIGNANT NEOPLASM OF LOWER-OUTER QUADRANT OF LEFT BREAST OF FEMALE, ESTROGEN RECEPTOR POSITIVE (HCC): ICD-10-CM

## 2021-06-28 DIAGNOSIS — M85.89 OSTEOPENIA OF MULTIPLE SITES: ICD-10-CM

## 2021-06-28 DIAGNOSIS — R21 RASH: ICD-10-CM

## 2021-06-28 PROBLEM — C50.112 MALIGNANT NEOPLASM OF CENTRAL PORTION OF LEFT BREAST IN FEMALE, ESTROGEN RECEPTOR POSITIVE (HCC): Status: RESOLVED | Noted: 2020-01-13 | Resolved: 2021-06-28

## 2021-06-28 PROCEDURE — 99214 OFFICE O/P EST MOD 30 MIN: CPT | Performed by: NURSE PRACTITIONER

## 2021-06-28 ASSESSMENT — ENCOUNTER SYMPTOMS
PALPITATIONS: 0
WHEEZING: 0
FEVER: 0
DIZZINESS: 0
VOMITING: 0
CHILLS: 0
WEIGHT LOSS: 0
MYALGIAS: 0
SHORTNESS OF BREATH: 0
COUGH: 0
DIAPHORESIS: 0
CONSTIPATION: 0
DIARRHEA: 0
NAUSEA: 0
HEADACHES: 0

## 2021-06-28 ASSESSMENT — PATIENT HEALTH QUESTIONNAIRE - PHQ9: CLINICAL INTERPRETATION OF PHQ2 SCORE: 0

## 2021-11-16 ENCOUNTER — TELEPHONE (OUTPATIENT)
Dept: HEMATOLOGY ONCOLOGY | Facility: MEDICAL CENTER | Age: 61
End: 2021-11-16

## 2021-11-16 NOTE — TELEPHONE ENCOUNTER
Patient called stating that she only has 3 pills left of her Anastrozole. I called the pharmacy to confirm when it was last filled as we wrote an order on 06/28/21. They stated that it was last filled on 05/05/21 and that we need authorization from her Ins before filling the 06/28 order. Received Prior Auth via fax.

## 2021-12-28 ENCOUNTER — APPOINTMENT (OUTPATIENT)
Dept: HEMATOLOGY ONCOLOGY | Facility: MEDICAL CENTER | Age: 61
End: 2021-12-28
Payer: MEDICAID

## 2022-01-06 ENCOUNTER — HOSPITAL ENCOUNTER (OUTPATIENT)
Dept: RADIOLOGY | Facility: MEDICAL CENTER | Age: 62
End: 2022-01-06
Attending: NURSE PRACTITIONER
Payer: MEDICAID

## 2022-01-06 ENCOUNTER — OFFICE VISIT (OUTPATIENT)
Dept: HEMATOLOGY ONCOLOGY | Facility: MEDICAL CENTER | Age: 62
End: 2022-01-06
Payer: MEDICAID

## 2022-01-06 VITALS
OXYGEN SATURATION: 95 % | TEMPERATURE: 97.8 F | BODY MASS INDEX: 31.27 KG/M2 | DIASTOLIC BLOOD PRESSURE: 118 MMHG | HEIGHT: 59 IN | RESPIRATION RATE: 18 BRPM | HEART RATE: 114 BPM | WEIGHT: 155.09 LBS | SYSTOLIC BLOOD PRESSURE: 168 MMHG

## 2022-01-06 DIAGNOSIS — Z91.89 AT HIGH RISK FOR OSTEOPOROSIS: ICD-10-CM

## 2022-01-06 DIAGNOSIS — C50.512 MALIGNANT NEOPLASM OF LOWER-OUTER QUADRANT OF LEFT BREAST OF FEMALE, ESTROGEN RECEPTOR POSITIVE (HCC): ICD-10-CM

## 2022-01-06 DIAGNOSIS — R06.02 SOB (SHORTNESS OF BREATH): ICD-10-CM

## 2022-01-06 DIAGNOSIS — Z79.811 AROMATASE INHIBITOR USE: ICD-10-CM
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
## 2022-01-06 DIAGNOSIS — R07.89 LEFT-SIDED CHEST WALL PAIN: ICD-10-CM

## 2022-01-06 DIAGNOSIS — M85.89 OSTEOPENIA OF MULTIPLE SITES: ICD-10-CM

## 2022-01-06 DIAGNOSIS — I10 PRIMARY HYPERTENSION: ICD-10-CM

## 2022-01-06 DIAGNOSIS — Z17.0 MALIGNANT NEOPLASM OF LOWER-OUTER QUADRANT OF LEFT BREAST OF FEMALE, ESTROGEN RECEPTOR POSITIVE (HCC): ICD-10-CM

## 2022-01-06 PROCEDURE — 99214 OFFICE O/P EST MOD 30 MIN: CPT | Performed by: NURSE PRACTITIONER

## 2022-01-06 PROCEDURE — 71101 X-RAY EXAM UNILAT RIBS/CHEST: CPT | Mod: LT,XU

## 2022-01-06 PROCEDURE — 71045 X-RAY EXAM CHEST 1 VIEW: CPT

## 2022-01-06 RX ORDER — LISINOPRIL 10 MG/1
TABLET ORAL
COMMUNITY
Start: 2021-10-19

## 2022-01-06 ASSESSMENT — ENCOUNTER SYMPTOMS
CONSTIPATION: 0
FEVER: 0
DIARRHEA: 0
VOMITING: 0
CHILLS: 0
DIAPHORESIS: 0
DEPRESSION: 0
TINGLING: 0
SHORTNESS OF BREATH: 1
NAUSEA: 0
PALPITATIONS: 0
WEIGHT LOSS: 0
FALLS: 1
NERVOUS/ANXIOUS: 0
DIZZINESS: 0
HEADACHES: 0
COUGH: 0
INSOMNIA: 1

## 2022-01-06 ASSESSMENT — PAIN SCALES - GENERAL: PAINLEVEL: 5=MODERATE PAIN

## 2022-01-06 ASSESSMENT — PATIENT HEALTH QUESTIONNAIRE - PHQ9: CLINICAL INTERPRETATION OF PHQ2 SCORE: 0

## 2022-01-06 NOTE — PROGRESS NOTES
Subjective     Lupis Colunga is a 61 y.o. female who presents with Breast Cancer (EST/6mth fv)          HPI    Patient seen today for 6-month follow-up visit for T1bN0 ER/LA +, ki-67 < 10%, grade 1 papillary breast cancer.  She presents unaccompanied for today's visit.    Cancer history:  Patient was found to have a 1 cm lesion on a screening mammogram, and follow-up ultrasound.  Ultrasound-guided biopsy showed DCIS with small foci of microinvasive ductal carcinoma with papillary features.  Patient underwent a partial mastectomy and negative sentinel lymph node dissection in December 2016. Postoperatively pathology confirmed Invasive grade 1 ductal carcinoma arising from solid papillary  carcinoma, ER/LA strongly positive, HER-2/jake negative and Ki-67 less than 10 %. Initial plan was DIEGO catheter placement however due to complications she did end up receiving adjuvant radiation therapy followed by aromatase inhibitor, anastrozole initiated May 2017.     Interval history:  Patient seen today for 6-month follow-up visit.  Patient continues on anastrozole and is tolerating it well.  She confirmed that she is taking vitamin D and calcium supplements daily.  She denies any changes to her breast tissue.  She denies any hot flashes.  Patient does still note to have heat rash under her breasts intermittently but not worsening and not bothersome.    Patient unfortunately recently fell on snow/ice approximately 4-5 days ago.  She fell on her left chest wall side.  She mentioned that she did hit her head but did not lose consciousness or blackout.  She stated since the fall she has had severe left-sided chest wall pain that made her concerned it was related to her breast.  Breast examination today does not note any abnormal findings.  She does have pain with movement, she has pain with deep inspiration.  Her lung sounds were clear with no abnormal sounds noted.  She did have pain with palpation to the left side of her  "chest wall.  No apparent deformities noted.  All the symptoms are acute and not present prior to her fall.  She has not been seen by a medical provider until now.        Allergies   Allergen Reactions   • Penicillins Anaphylaxis     Tolerates cephalosporins; blisters     Current Outpatient Medications on File Prior to Visit   Medication Sig Dispense Refill   • lisinopril (PRINIVIL) 10 MG Tab      • anastrozole (ARIMIDEX) 1 MG Tab TAKE 1 TABLET BY MOUTH EVERY DAY 90 tablet 1   • atorvastatin (LIPITOR) 40 MG Tab Take 40 mg by mouth.     • levothyroxine (SYNTHROID) 75 MCG Tab Take 75 mcg by mouth Every morning on an empty stomach.     • aspirin EC (ECOTRIN) 81 MG Tablet Delayed Response Take 81 mg by mouth.     • enalapril (VASOTEC) 10 MG Tab Take 10 mg by mouth every day.     • simvastatin (ZOCOR) 20 MG Tab Take 20 mg by mouth every evening.       No current facility-administered medications on file prior to visit.       Review of Systems   Constitutional: Negative for chills, diaphoresis, fever, malaise/fatigue and weight loss.   Respiratory: Positive for shortness of breath (related to recent fall). Negative for cough.    Cardiovascular: Positive for chest pain (left chest wall pain at breast site from fall). Negative for palpitations.   Gastrointestinal: Negative for constipation, diarrhea, nausea and vomiting.   Genitourinary: Negative for dysuria.   Musculoskeletal: Positive for falls (fell on the ice/snow 1/2/22).   Skin: Positive for rash (heat rash under breasts still present but not worse). Negative for itching.   Neurological: Negative for dizziness, tingling and headaches.   Psychiatric/Behavioral: Negative for depression. The patient has insomnia (r/t pain from her fall). The patient is not nervous/anxious.               Objective     BP (!) 168/118   Pulse (!) 114   Temp 36.6 °C (97.8 °F) (Temporal)   Resp 18   Ht 1.499 m (4' 11\")   Wt 70.4 kg (155 lb 1.5 oz)   SpO2 95%   BMI 31.33 kg/m²  "     Physical Exam  Vitals reviewed.   Constitutional:       General: She is not in acute distress.     Appearance: Normal appearance. She is not diaphoretic.   HENT:      Head: Normocephalic and atraumatic.   Cardiovascular:      Rate and Rhythm: Tachycardia present.      Heart sounds: No murmur heard.  No friction rub. No gallop.    Pulmonary:      Effort: Pulmonary effort is normal. No respiratory distress.      Breath sounds: No wheezing.      Comments: SOB noted with movement associated with pain  Chest:      Chest wall: Tenderness (left sided chest wall pain) present. No mass or lacerations.   Breasts:      Right: No swelling, bleeding, inverted nipple, mass, nipple discharge, skin change, tenderness or supraclavicular adenopathy.      Left: No swelling, bleeding, inverted nipple, mass, nipple discharge, skin change, tenderness or supraclavicular adenopathy.       Abdominal:      General: Bowel sounds are normal.      Palpations: Abdomen is soft.   Musculoskeletal:         General: Tenderness (left chest wall from fall) present. No swelling.   Lymphadenopathy:      Upper Body:      Right upper body: No supraclavicular adenopathy.      Left upper body: No supraclavicular adenopathy.   Skin:     General: Skin is warm and dry.   Neurological:      Mental Status: She is alert and oriented to person, place, and time.   Psychiatric:         Mood and Affect: Mood normal.         Behavior: Behavior normal.               MA-DIAGNOSTIC MAMMO BILAT W/TOMOSYNTHESIS W/CAD  06/22/21  IMPRESSION:     1.  No radiographic evidence of malignancy bilaterally.     2.  Bilateral annual follow-up mammography is recommended.     These results were given to the patient at the time of visit.     R1 - Category 1:  Negative        DS-BONE DENSITY STUDY (DEXA)  06/22/21  IMPRESSION:     According to the World Health Organization classification, bone mineral density of this patient is osteopenia with increased fracture risk for the lumbar  spine and left femur.           Assessment & Plan        1. Malignant neoplasm of lower-outer quadrant of left breast of female, estrogen receptor positive (HCC)  MA-DIAGNOSTIC MAMMO BILAT W/TOMOSYNTHESIS W/CAD   2. Aromatase inhibitor use     3. Osteopenia of multiple sites     4. At high risk for osteoporosis     5. Left-sided chest wall pain  DX-CHEST-2 VIEWS    [id number] (WITH 1-VIEW CXR) LEFT   6. SOB (shortness of breath)  DX-CHEST-2 VIEWS    [id number] (WITH 1-VIEW CXR) LEFT   7. Fall due to slipping on ice or snow, initial encounter     8. Primary hypertension         1. Patient with T1bN0 ER/RI +, ki-67 < 10%, grade 1 papillary breast cancer currently on Anastrazole initiated May 2017. Plan for 5 years of treatment, due to complete 2022.  Patient's next mammogram is due in June 2022.  Discussed with patient that we will have her complete her annual mammogram and then have her follow-up in the clinic for 6-month follow-up visit.  At that time we can discuss discontinuation of her anastrozole.    2.  Patient noted to have osteopenia multiple sites in June 2021.  Confirmed at last visit the dosing of vitamin D and calcium supplements and patient did confirm today that she is continue to take this consistently daily.  She will be due for her next bone density in June 2023.  Patient to continue on vitamin D and calcium as recommended.    3.  Patient with an acute fall on the snow/ice approximately 4-5 days ago.  She is in apparent pain on left chest wall.  Physical examination does not note any abnormal findings or deformities on physical exam of the breast and breast tissue.  However she does have apparent pain with movement as well as to palpation on the left chest wall.  She also has some pain with deep inspiration at times as well.  Lung sounds were clear.  However, I will go ahead and proceed with a left rib series as well as a chest x-ray for further evaluation.  I will contact patient  once results have been received.    4.  Patient does have hypertension is being managed by her primary care provider.  She did state that she recently was changed to lisinopril.  Her blood pressure was elevated today in the clinic and retaken after office visit and still elevated.  Her elevation may be related to her pain.  She also stated that she had difficulty getting to her appointment today and was feeling overwhelmed which could be also the cause.  However with her recent change in medication I have highly encouraged patient to get back in and see her PCP soon as possible for recheck of her blood pressure to ensure that she is on the correct dose of her new medication.  I have requested an BEA be completed and will fax over my note today to PCP so he is aware patient needs follow-up for her blood pressure.  Patient did verbalize understanding of this recommendation.    5.  Patient to follow-up in the clinic in 6 months with mammogram, or sooner if needed.      Addendum:  Patient did complete the x-ray of her ribs as well as chest x-ray.  There was no displaced rib fracture.  There is no pneumothorax.  No acute cardiopulmonary abnormality.  She does have cardiomegaly.  Contacted patient via phone with regards to the results.  Her pain is likely related to the acute nature of her fall and she is just having some soreness in this location.  Patient was very pleased with the results.  I did recommend that if she does not have any improvement she needs to be seen by urgent care or her primary care provider.  I also highly encouraged patient to get back into see her PCP and have reevaluation of her blood pressure.  She did state that she does have a blood pressure cuff at home and she will find it and start using it to keep a good eye on her blood pressure as well.  We have since faxed over my note today to her PCP making him aware of the elevated blood pressure she had in my clinic today.      DX-CHEST-LIMITED (1  VIEW)    Result Date: 1/6/2022 1/6/2022 1:04 PM HISTORY/REASON FOR EXAM:  Pain Following Trauma; SOB with left sided chest wall pain after fall TECHNIQUE/EXAM DESCRIPTION AND NUMBER OF VIEWS: Single lateral view of the chest. Single frontal view of the chest. 4 views of the left ribs. COMPARISON: Chest radiograph dated 11/18/2013 FINDINGS: No displaced rib fractures seen HEART: Enlarged. LUNGS: No areas of air space disease are demonstrated. PLEURA: No effusion or pneumothorax.     1.  No displaced rib fracture. 2.  No acute cardiopulmonary abnormality. 3.  No pneumothorax. 4.  Cardiomegaly.    [id number] (WITH 1-VIEW CXR) LEFT    Result Date: 1/6/2022 1/6/2022 1:04 PM HISTORY/REASON FOR EXAM:  Pain Following Trauma; SOB with left sided chest wall pain after fall TECHNIQUE/EXAM DESCRIPTION AND NUMBER OF VIEWS: Single lateral view of the chest. Single frontal view of the chest. 4 views of the left ribs. COMPARISON: Chest radiograph dated 11/18/2013 FINDINGS: No displaced rib fractures seen HEART: Enlarged. LUNGS: No areas of air space disease are demonstrated. PLEURA: No effusion or pneumothorax.     1.  No displaced rib fracture. 2.  No acute cardiopulmonary abnormality. 3.  No pneumothorax. 4.  Cardiomegaly.        Please note that this dictation was created using voice recognition software. I have made every reasonable attempt to correct obvious errors, but I expect that there are errors of grammar and possibly content that I did not discover before finalizing the note.

## 2022-06-29 ENCOUNTER — APPOINTMENT (OUTPATIENT)
Dept: HEMATOLOGY ONCOLOGY | Facility: MEDICAL CENTER | Age: 62
End: 2022-06-29
Payer: MEDICAID

## 2022-07-28 ENCOUNTER — TELEPHONE (OUTPATIENT)
Dept: HEMATOLOGY ONCOLOGY | Facility: MEDICAL CENTER | Age: 62
End: 2022-07-28
Payer: MEDICAID

## 2022-07-28 NOTE — TELEPHONE ENCOUNTER
Contacted patient regarding missed appt from 7/27/22. Informed patient that we needed her to get her mammogram done prior to this appt. Pt stated she was having trouble with her transportation and is having issues getting to appointments. Pt stated she would try to make the appt for her  Mammogram and would contact our office so we can r/s to ensure we have the results from the mammo.

## 2024-01-01 ENCOUNTER — APPOINTMENT (OUTPATIENT)
Dept: RADIOLOGY | Facility: MEDICAL CENTER | Age: 64
End: 2024-01-01
Attending: STUDENT IN AN ORGANIZED HEALTH CARE EDUCATION/TRAINING PROGRAM
Payer: COMMERCIAL

## 2024-01-01 ENCOUNTER — HOSPITAL ENCOUNTER (EMERGENCY)
Facility: MEDICAL CENTER | Age: 64
End: 2024-04-06
Attending: STUDENT IN AN ORGANIZED HEALTH CARE EDUCATION/TRAINING PROGRAM | Admitting: STUDENT IN AN ORGANIZED HEALTH CARE EDUCATION/TRAINING PROGRAM
Payer: COMMERCIAL

## 2024-01-01 VITALS
HEART RATE: 54 BPM | OXYGEN SATURATION: 98 % | DIASTOLIC BLOOD PRESSURE: 59 MMHG | RESPIRATION RATE: 26 BRPM | WEIGHT: 160 LBS | HEIGHT: 59 IN | SYSTOLIC BLOOD PRESSURE: 110 MMHG | BODY MASS INDEX: 32.25 KG/M2

## 2024-01-01 DIAGNOSIS — I62.9 INTRACRANIAL BLEEDING (HCC): ICD-10-CM

## 2024-01-01 DIAGNOSIS — J96.01 ACUTE HYPOXIC RESPIRATORY FAILURE (HCC): ICD-10-CM

## 2024-01-01 LAB
ABO + RH BLD: NORMAL
ABO GROUP BLD: NORMAL
ALBUMIN SERPL BCP-MCNC: 4.3 G/DL (ref 3.2–4.9)
ALBUMIN/GLOB SERPL: 1.3 G/DL
ALP SERPL-CCNC: 131 U/L (ref 30–99)
ALT SERPL-CCNC: 28 U/L (ref 2–50)
AMPHET UR QL SCN: POSITIVE
ANION GAP SERPL CALC-SCNC: 17 MMOL/L (ref 7–16)
APAP SERPL-MCNC: <5 UG/ML (ref 10–30)
APPEARANCE UR: CLEAR
APTT PPP: 24.8 SEC (ref 24.7–36)
AST SERPL-CCNC: 45 U/L (ref 12–45)
BACTERIA #/AREA URNS HPF: NEGATIVE /HPF
BARBITURATES UR QL SCN: NEGATIVE
BASE EXCESS BLDA CALC-SCNC: -9 MMOL/L (ref -4–3)
BASOPHILS # BLD AUTO: 0.9 % (ref 0–1.8)
BASOPHILS # BLD: 0.11 K/UL (ref 0–0.12)
BENZODIAZ UR QL SCN: NEGATIVE
BILIRUB SERPL-MCNC: 1 MG/DL (ref 0.1–1.5)
BILIRUB UR QL STRIP.AUTO: NEGATIVE
BLD GP AB SCN SERPL QL: NORMAL
BODY TEMPERATURE: ABNORMAL DEGREES
BREATHS SETTING VENT: 26
BUN SERPL-MCNC: 22 MG/DL (ref 8–22)
BZE UR QL SCN: NEGATIVE
CALCIUM ALBUM COR SERPL-MCNC: 9.7 MG/DL (ref 8.5–10.5)
CALCIUM SERPL-MCNC: 9.9 MG/DL (ref 8.5–10.5)
CANNABINOIDS UR QL SCN: NEGATIVE
CFT BLD TEG: 4.2 MIN (ref 4.6–9.1)
CFT P HPASE BLD TEG: 4.2 MIN (ref 4.3–8.3)
CHLORIDE SERPL-SCNC: 102 MMOL/L (ref 96–112)
CLOT ANGLE BLD TEG: 71.4 DEGREES (ref 63–78)
CLOT LYSIS 30M P MA LENFR BLD TEG: 0.5 % (ref 0–2.6)
CO2 BLDA-SCNC: 20 MMOL/L (ref 20–33)
CO2 SERPL-SCNC: 19 MMOL/L (ref 20–33)
COLOR UR: YELLOW
CREAT SERPL-MCNC: 1.09 MG/DL (ref 0.5–1.4)
CT.EXTRINSIC BLD ROTEM: 1.4 MIN (ref 0.8–2.1)
DELSYS IDSYS: ABNORMAL
EKG IMPRESSION: NORMAL
EOSINOPHIL # BLD AUTO: 0.09 K/UL (ref 0–0.51)
EOSINOPHIL NFR BLD: 0.7 % (ref 0–6.9)
EPI CELLS #/AREA URNS HPF: NEGATIVE /HPF
ERYTHROCYTE [DISTWIDTH] IN BLOOD BY AUTOMATED COUNT: 49.5 FL (ref 35.9–50)
ETHANOL BLD-MCNC: <10.1 MG/DL
FENTANYL UR QL: NEGATIVE
GFR SERPLBLD CREATININE-BSD FMLA CKD-EPI: 56 ML/MIN/1.73 M 2
GLOBULIN SER CALC-MCNC: 3.3 G/DL (ref 1.9–3.5)
GLUCOSE SERPL-MCNC: 299 MG/DL (ref 65–99)
GLUCOSE UR STRIP.AUTO-MCNC: 250 MG/DL
HCO3 BLDA-SCNC: 18.8 MMOL/L (ref 17–25)
HCT VFR BLD AUTO: 52.2 % (ref 37–47)
HGB BLD-MCNC: 16.3 G/DL (ref 12–16)
HOROWITZ INDEX BLDA+IHG-RTO: 213 MM[HG]
HYALINE CASTS #/AREA URNS LPF: NORMAL /LPF
IMM GRANULOCYTES # BLD AUTO: 0.1 K/UL (ref 0–0.11)
IMM GRANULOCYTES NFR BLD AUTO: 0.8 % (ref 0–0.9)
INR PPP: 1.01 (ref 0.87–1.13)
KETONES UR STRIP.AUTO-MCNC: ABNORMAL MG/DL
LACTATE BLD-SCNC: 3.2 MMOL/L (ref 0.5–2)
LACTATE SERPL-SCNC: 6.9 MMOL/L (ref 0.5–2)
LEUKOCYTE ESTERASE UR QL STRIP.AUTO: NEGATIVE
LYMPHOCYTES # BLD AUTO: 2.54 K/UL (ref 1–4.8)
LYMPHOCYTES NFR BLD: 20.8 % (ref 22–41)
MCF BLD TEG: 58.3 MM (ref 52–69)
MCF.PLATELET INHIB BLD ROTEM: 17.8 MM (ref 15–32)
MCH RBC QN AUTO: 31 PG (ref 27–33)
MCHC RBC AUTO-ENTMCNC: 31.2 G/DL (ref 32.2–35.5)
MCV RBC AUTO: 99.4 FL (ref 81.4–97.8)
METHADONE UR QL SCN: NEGATIVE
MICRO URNS: ABNORMAL
MODE IMODE: ABNORMAL
MONOCYTES # BLD AUTO: 0.72 K/UL (ref 0–0.85)
MONOCYTES NFR BLD AUTO: 5.9 % (ref 0–13.4)
NEUTROPHILS # BLD AUTO: 8.66 K/UL (ref 1.82–7.42)
NEUTROPHILS NFR BLD: 70.9 % (ref 44–72)
NITRITE UR QL STRIP.AUTO: NEGATIVE
NRBC # BLD AUTO: 0 K/UL
NRBC BLD-RTO: 0 /100 WBC (ref 0–0.2)
NT-PROBNP SERPL IA-MCNC: 5198 PG/ML (ref 0–125)
O2/TOTAL GAS SETTING VFR VENT: 70 %
OPIATES UR QL SCN: NEGATIVE
OXYCODONE UR QL SCN: NEGATIVE
PA AA BLD-ACNC: 84.6 % (ref 0–11)
PA ADP BLD-ACNC: 82.3 % (ref 0–17)
PCO2 BLDA: 45 MMHG (ref 26–37)
PCO2 TEMP ADJ BLDA: 41.2 MMHG (ref 26–37)
PCP UR QL SCN: NEGATIVE
PEEP END EXPIRATORY PRESSURE IPEEP: 8 CMH20
PH BLDA: 7.23 [PH] (ref 7.4–7.5)
PH TEMP ADJ BLDA: 7.26 [PH] (ref 7.4–7.5)
PH UR STRIP.AUTO: 7.5 [PH] (ref 5–8)
PLATELET # BLD AUTO: 163 K/UL (ref 164–446)
PMV BLD AUTO: 12.2 FL (ref 9–12.9)
PO2 BLDA: 149 MMHG (ref 64–87)
PO2 TEMP ADJ BLDA: 138 MMHG (ref 64–87)
POTASSIUM SERPL-SCNC: 4.5 MMOL/L (ref 3.6–5.5)
PROPOXYPH UR QL SCN: NEGATIVE
PROT SERPL-MCNC: 7.6 G/DL (ref 6–8.2)
PROT UR QL STRIP: 100 MG/DL
PROTHROMBIN TIME: 13.4 SEC (ref 12–14.6)
RBC # BLD AUTO: 5.25 M/UL (ref 4.2–5.4)
RBC # URNS HPF: NORMAL /HPF
RBC UR QL AUTO: NEGATIVE
RH BLD: NORMAL
SALICYLATES SERPL-MCNC: <1 MG/DL (ref 15–25)
SAO2 % BLDA: 99 % (ref 93–99)
SODIUM SERPL-SCNC: 138 MMOL/L (ref 135–145)
SP GR UR STRIP.AUTO: 1.01
SPECIMEN DRAWN FROM PATIENT: ABNORMAL
T4 FREE SERPL-MCNC: 1.61 NG/DL (ref 0.93–1.7)
TEG ALGORITHM TGALG: ABNORMAL
TIDAL VOLUME IVT: 350 ML
TRIGL SERPL-MCNC: 74 MG/DL (ref 0–149)
TROPONIN T SERPL-MCNC: 30 NG/L (ref 6–19)
TSH SERPL DL<=0.005 MIU/L-ACNC: 0.16 UIU/ML (ref 0.38–5.33)
UROBILINOGEN UR STRIP.AUTO-MCNC: 0.2 MG/DL
WBC # BLD AUTO: 12.2 K/UL (ref 4.8–10.8)
WBC #/AREA URNS HPF: NORMAL /HPF

## 2024-01-01 PROCEDURE — 93005 ELECTROCARDIOGRAM TRACING: CPT | Performed by: STUDENT IN AN ORGANIZED HEALTH CARE EDUCATION/TRAINING PROGRAM

## 2024-01-01 PROCEDURE — 86850 RBC ANTIBODY SCREEN: CPT

## 2024-01-01 PROCEDURE — 82077 ASSAY SPEC XCP UR&BREATH IA: CPT

## 2024-01-01 PROCEDURE — 87040 BLOOD CULTURE FOR BACTERIA: CPT | Mod: 91

## 2024-01-01 PROCEDURE — 92950 HEART/LUNG RESUSCITATION CPR: CPT

## 2024-01-01 PROCEDURE — 94002 VENT MGMT INPAT INIT DAY: CPT

## 2024-01-01 PROCEDURE — 700111 HCHG RX REV CODE 636 W/ 250 OVERRIDE (IP): Mod: UD | Performed by: INTERNAL MEDICINE

## 2024-01-01 PROCEDURE — 80053 COMPREHEN METABOLIC PANEL: CPT

## 2024-01-01 PROCEDURE — 84484 ASSAY OF TROPONIN QUANT: CPT

## 2024-01-01 PROCEDURE — 80179 DRUG ASSAY SALICYLATE: CPT

## 2024-01-01 PROCEDURE — 36600 WITHDRAWAL OF ARTERIAL BLOOD: CPT | Mod: XU

## 2024-01-01 PROCEDURE — 85347 COAGULATION TIME ACTIVATED: CPT

## 2024-01-01 PROCEDURE — 700105 HCHG RX REV CODE 258: Mod: UD | Performed by: STUDENT IN AN ORGANIZED HEALTH CARE EDUCATION/TRAINING PROGRAM

## 2024-01-01 PROCEDURE — 96366 THER/PROPH/DIAG IV INF ADDON: CPT

## 2024-01-01 PROCEDURE — 96376 TX/PRO/DX INJ SAME DRUG ADON: CPT

## 2024-01-01 PROCEDURE — 99285 EMERGENCY DEPT VISIT HI MDM: CPT

## 2024-01-01 PROCEDURE — 51702 INSERT TEMP BLADDER CATH: CPT | Mod: XU

## 2024-01-01 PROCEDURE — 96375 TX/PRO/DX INJ NEW DRUG ADDON: CPT | Mod: XU

## 2024-01-01 PROCEDURE — 85025 COMPLETE CBC W/AUTO DIFF WBC: CPT

## 2024-01-01 PROCEDURE — 96368 THER/DIAG CONCURRENT INF: CPT | Mod: XU

## 2024-01-01 PROCEDURE — 82803 BLOOD GASES ANY COMBINATION: CPT

## 2024-01-01 PROCEDURE — 86900 BLOOD TYPING SEROLOGIC ABO: CPT

## 2024-01-01 PROCEDURE — 71045 X-RAY EXAM CHEST 1 VIEW: CPT

## 2024-01-01 PROCEDURE — 303105 HCHG CATHETER EXTRA

## 2024-01-01 PROCEDURE — 85730 THROMBOPLASTIN TIME PARTIAL: CPT

## 2024-01-01 PROCEDURE — 80307 DRUG TEST PRSMV CHEM ANLYZR: CPT

## 2024-01-01 PROCEDURE — 86901 BLOOD TYPING SEROLOGIC RH(D): CPT

## 2024-01-01 PROCEDURE — 96365 THER/PROPH/DIAG IV INF INIT: CPT | Mod: XU

## 2024-01-01 PROCEDURE — 31500 INSERT EMERGENCY AIRWAY: CPT

## 2024-01-01 PROCEDURE — 700111 HCHG RX REV CODE 636 W/ 250 OVERRIDE (IP): Mod: JZ,UD | Performed by: STUDENT IN AN ORGANIZED HEALTH CARE EDUCATION/TRAINING PROGRAM

## 2024-01-01 PROCEDURE — 36415 COLL VENOUS BLD VENIPUNCTURE: CPT

## 2024-01-01 PROCEDURE — 84443 ASSAY THYROID STIM HORMONE: CPT

## 2024-01-01 PROCEDURE — 85610 PROTHROMBIN TIME: CPT

## 2024-01-01 PROCEDURE — 83880 ASSAY OF NATRIURETIC PEPTIDE: CPT

## 2024-01-01 PROCEDURE — 83605 ASSAY OF LACTIC ACID: CPT

## 2024-01-01 PROCEDURE — 81001 URINALYSIS AUTO W/SCOPE: CPT | Mod: XU

## 2024-01-01 PROCEDURE — 80143 DRUG ASSAY ACETAMINOPHEN: CPT

## 2024-01-01 PROCEDURE — 70450 CT HEAD/BRAIN W/O DYE: CPT

## 2024-01-01 PROCEDURE — 84478 ASSAY OF TRIGLYCERIDES: CPT

## 2024-01-01 PROCEDURE — 302214 INTUBATION BOX: Performed by: STUDENT IN AN ORGANIZED HEALTH CARE EDUCATION/TRAINING PROGRAM

## 2024-01-01 PROCEDURE — 84439 ASSAY OF FREE THYROXINE: CPT

## 2024-01-01 PROCEDURE — 85384 FIBRINOGEN ACTIVITY: CPT | Mod: 91

## 2024-01-01 PROCEDURE — 85576 BLOOD PLATELET AGGREGATION: CPT

## 2024-01-01 PROCEDURE — 700101 HCHG RX REV CODE 250: Performed by: STUDENT IN AN ORGANIZED HEALTH CARE EDUCATION/TRAINING PROGRAM

## 2024-01-01 RX ORDER — LEVETIRACETAM 500 MG/5ML
1500 INJECTION, SOLUTION, CONCENTRATE INTRAVENOUS ONCE
Status: COMPLETED | OUTPATIENT
Start: 2024-01-01 | End: 2024-01-01

## 2024-01-01 RX ORDER — HYDROMORPHONE HYDROCHLORIDE 1 MG/ML
1-2 INJECTION, SOLUTION INTRAMUSCULAR; INTRAVENOUS; SUBCUTANEOUS
Status: DISCONTINUED | OUTPATIENT
Start: 2024-01-01 | End: 2024-01-01 | Stop reason: HOSPADM

## 2024-01-01 RX ORDER — SUCCINYLCHOLINE CHLORIDE 20 MG/ML
INJECTION INTRAMUSCULAR; INTRAVENOUS
Status: COMPLETED | OUTPATIENT
Start: 2024-01-01 | End: 2024-01-01

## 2024-01-01 RX ORDER — LABETALOL HYDROCHLORIDE 5 MG/ML
INJECTION, SOLUTION INTRAVENOUS
Status: DISCONTINUED
Start: 2024-01-01 | End: 2024-01-01 | Stop reason: HOSPADM

## 2024-01-01 RX ORDER — ETOMIDATE 2 MG/ML
INJECTION INTRAVENOUS
Status: COMPLETED | OUTPATIENT
Start: 2024-01-01 | End: 2024-01-01

## 2024-01-01 RX ORDER — LABETALOL HYDROCHLORIDE 5 MG/ML
INJECTION, SOLUTION INTRAVENOUS
Status: COMPLETED | OUTPATIENT
Start: 2024-01-01 | End: 2024-01-01

## 2024-01-01 RX ORDER — NOREPINEPHRINE BITARTRATE 0.03 MG/ML
0-1 INJECTION, SOLUTION INTRAVENOUS CONTINUOUS
Status: DISCONTINUED | OUTPATIENT
Start: 2024-01-01 | End: 2024-01-01 | Stop reason: HOSPADM

## 2024-01-01 RX ADMIN — LEVETIRACETAM 1500 MG: 100 INJECTION, SOLUTION, CONCENTRATE INTRAVENOUS at 23:30

## 2024-01-01 RX ADMIN — ETOMIDATE 20 MG: 2 INJECTION, SOLUTION INTRAVENOUS at 22:43

## 2024-01-01 RX ADMIN — SUCCINYLCHOLINE CHLORIDE 100 MG: 20 INJECTION, SOLUTION INTRAMUSCULAR; INTRAVENOUS at 22:44

## 2024-01-01 RX ADMIN — LABETALOL HYDROCHLORIDE 20 MG: 5 INJECTION, SOLUTION INTRAVENOUS at 22:47

## 2024-01-01 RX ADMIN — DESMOPRESSIN ACETATE 28 MCG: 4 INJECTION INTRAVENOUS; SUBCUTANEOUS at 23:41

## 2024-01-01 RX ADMIN — HYDROMORPHONE HYDROCHLORIDE 2 MG: 1 INJECTION, SOLUTION INTRAMUSCULAR; INTRAVENOUS; SUBCUTANEOUS at 01:39

## 2024-01-01 RX ADMIN — NOREPINEPHRINE BITARTRATE 0.05 MCG/KG/MIN: 1 INJECTION INTRAVENOUS at 23:08

## 2024-01-01 RX ADMIN — HYDROMORPHONE HYDROCHLORIDE 1 MG: 1 INJECTION, SOLUTION INTRAMUSCULAR; INTRAVENOUS; SUBCUTANEOUS at 00:34

## 2024-04-06 NOTE — DISCHARGE PLANNING
Medical Social Work     TRELL, RN, and ERP met with the pt son Young and  Pietro at bedside the ERP updated the pt family on how critically ill the pt is and that her illness is not survivable. The pt family asked if they can bring other family back. After all the family came to see the pt the pt son and  advised staff that they did not want to be here when she  but they would like a call from [facility] when she dies.     TRELL received a call from the ERP advising  that the pt  at [phone]. TRELL called the pt son Young and advised him of the pt death. The pt son Young was tearful on the phone and was not ready to here next set. Young and the other family did not have questions at this time.     No Mortuary choice.

## 2024-04-06 NOTE — ED PROVIDER NOTES
ED Provider Note    CHIEF COMPLAINT  Chief Complaint   Patient presents with    ALOC       EXTERNAL RECORDS REVIEWED  Outpatient Notes last records available are from 2022 when patient was evaluated by hematology oncology for history of malignant neoplasm of the left breast for which she is on anastrozole therapy    HPI/ROS  LIMITATION TO HISTORY   Select: Altered mental status / Confusion  OUTSIDE HISTORIAN(S):  EMS who report that the patient's son found her unresponsive in bed and called EMS.  On arrival the patient had agonal respirations and was found to be profoundly hypertensive.  They began bagging the patient obtained an EKG demonstrating ST depression and transported the patient to the ER.    Lupis Colunga is a 63 y.o. female who presents to the emergency department for evaluation of altered level of consciousness.  Patient unresponsive at the time of evaluation being bagged by EMS.  No additional history available at this time.    PAST MEDICAL HISTORY   has a past medical history of Cancer (HCC) (2016), Dental disorder (2016), Hypertension, and Hypothyroidism.    SURGICAL HISTORY   has a past surgical history that includes appendectomy laparoscopic (11/13/2013); other (1977); mastectomy (Left, 12/5/2016); node biopsy sentinel (Left, 12/5/2016); axillary node dissection (Left, 12/5/2016); and hardware removal ortho (Right, 3/23/2018).    FAMILY HISTORY  No family history on file.    SOCIAL HISTORY  Social History     Tobacco Use    Smoking status: Every Day     Types: Cigarettes    Smokeless tobacco: Never    Tobacco comments:     1/2 PPD   Substance and Sexual Activity    Alcohol use: No     Comment: denies    Drug use: Yes     Comment: weed    Sexual activity: Not on file       CURRENT MEDICATIONS  Home Medications    **Home medications have not yet been reviewed for this encounter**         ALLERGIES  Allergies   Allergen Reactions    Penicillins Anaphylaxis     Tolerates cephalosporins; blisters  "      PHYSICAL EXAM  VITAL SIGNS: /59   Pulse (!) 54   Resp (!) 26   Ht 1.499 m (4' 11\")   Wt 72.6 kg (160 lb)   SpO2 98%   BMI 32.32 kg/m²    Constitutional: Critically ill being bagged by EMS unresponsive  HEENT: Atraumatic, normocephalic, pupils 6 mm fixed and dilated, dry mucous membranes  Neck: Supple, no JVD  Cardiovascular: Tachycardic regular rhythm, mottled lower extremities  Thorax & Lungs: Apneic being bagged  GI: Soft, non-distended, non-tender, no rebound  Skin: Warm, dry, no acute rash or lesion upper extremities, cool and mottled lower extremities  Musculoskeletal: No obvious bony deformity or tenderness  Neurologic: GCS of 3, unresponsive, no spontaneous movement, no pupillary response      EKG/LABS  Results for orders placed or performed during the hospital encounter of 04/05/24   CBC WITH DIFFERENTIAL   Result Value Ref Range    WBC 12.2 (H) 4.8 - 10.8 K/uL    RBC 5.25 4.20 - 5.40 M/uL    Hemoglobin 16.3 (H) 12.0 - 16.0 g/dL    Hematocrit 52.2 (H) 37.0 - 47.0 %    MCV 99.4 (H) 81.4 - 97.8 fL    MCH 31.0 27.0 - 33.0 pg    MCHC 31.2 (L) 32.2 - 35.5 g/dL    RDW 49.5 35.9 - 50.0 fL    Platelet Count 163 (L) 164 - 446 K/uL    MPV 12.2 9.0 - 12.9 fL    Neutrophils-Polys 70.90 44.00 - 72.00 %    Lymphocytes 20.80 (L) 22.00 - 41.00 %    Monocytes 5.90 0.00 - 13.40 %    Eosinophils 0.70 0.00 - 6.90 %    Basophils 0.90 0.00 - 1.80 %    Immature Granulocytes 0.80 0.00 - 0.90 %    Nucleated RBC 0.00 0.00 - 0.20 /100 WBC    Neutrophils (Absolute) 8.66 (H) 1.82 - 7.42 K/uL    Lymphs (Absolute) 2.54 1.00 - 4.80 K/uL    Monos (Absolute) 0.72 0.00 - 0.85 K/uL    Eos (Absolute) 0.09 0.00 - 0.51 K/uL    Baso (Absolute) 0.11 0.00 - 0.12 K/uL    Immature Granulocytes (abs) 0.10 0.00 - 0.11 K/uL    NRBC (Absolute) 0.00 K/uL   COMP METABOLIC PANEL   Result Value Ref Range    Sodium 138 135 - 145 mmol/L    Potassium 4.5 3.6 - 5.5 mmol/L    Chloride 102 96 - 112 mmol/L    Co2 19 (L) 20 - 33 mmol/L    Anion Gap " 17.0 (H) 7.0 - 16.0    Glucose 299 (H) 65 - 99 mg/dL    Bun 22 8 - 22 mg/dL    Creatinine 1.09 0.50 - 1.40 mg/dL    Calcium 9.9 8.5 - 10.5 mg/dL    Correct Calcium 9.7 8.5 - 10.5 mg/dL    AST(SGOT) 45 12 - 45 U/L    ALT(SGPT) 28 2 - 50 U/L    Alkaline Phosphatase 131 (H) 30 - 99 U/L    Total Bilirubin 1.0 0.1 - 1.5 mg/dL    Albumin 4.3 3.2 - 4.9 g/dL    Total Protein 7.6 6.0 - 8.2 g/dL    Globulin 3.3 1.9 - 3.5 g/dL    A-G Ratio 1.3 g/dL   TROPONIN   Result Value Ref Range    Troponin T 30 (H) 6 - 19 ng/L   proBrain Natriuretic Peptide, NT   Result Value Ref Range    NT-proBNP 5198 (H) 0 - 125 pg/mL   LACTIC ACID   Result Value Ref Range    Lactic Acid 6.9 (HH) 0.5 - 2.0 mmol/L   COD (ADULT)   Result Value Ref Range    ABO Grouping Only B     Rh Grouping Only POS     Antibody Screen-Cod NEG    APTT   Result Value Ref Range    APTT 24.8 24.7 - 36.0 sec   PROTHROMBIN TIME (INR)   Result Value Ref Range    PT 13.4 12.0 - 14.6 sec    INR 1.01 0.87 - 1.13   PLATELET MAPPING WITH BASIC TEG   Result Value Ref Range    Reaction Time Initial-R 4.2 (L) 4.6 - 9.1 min    React Time Initial Hep 4.2 (L) 4.3 - 8.3 min    Clot Kinetics-K 1.4 0.8 - 2.1 min    Clot Angle-Angle 71.4 63.0 - 78.0 degrees    Maximum Clot Strength-MA 58.3 52.0 - 69.0 mm    TEG Functional Fibrinogen(MA) 17.8 15.0 - 32.0 mm    Lysis 30 minutes-LY30 0.5 0.0 - 2.6 %    % Inhibition ADP 82.3 (H) 0.0 - 17.0 %    % Inhibition AA 84.6 (H) 0.0 - 11.0 %    TEG Algorithm Link Algorithm    URINALYSIS CULTURE, IF INDICATED    Specimen: Urine, Clean Catch; Blood   Result Value Ref Range    Color Yellow     Character Clear     Specific Gravity 1.012 <1.035    Ph 7.5 5.0 - 8.0    Glucose 250 (A) Negative mg/dL    Ketones Trace (A) Negative mg/dL    Protein 100 (A) Negative mg/dL    Bilirubin Negative Negative    Urobilinogen, Urine 0.2 Negative    Nitrite Negative Negative    Leukocyte Esterase Negative Negative    Occult Blood Negative Negative    Micro Urine Req  Microscopic    URINE DRUG SCREEN   Result Value Ref Range    Amphetamines Urine Positive (A) Negative    Barbiturates Negative Negative    Benzodiazepines Negative Negative    Cocaine Metabolite Negative Negative    Fentanyl, Urine Negative Negative    Methadone Negative Negative    Opiates Negative Negative    Oxycodone Negative Negative    Phencyclidine -Pcp Negative Negative    Propoxyphene Negative Negative    Cannabinoid Metab Negative Negative   URINE MICROSCOPIC (W/UA)   Result Value Ref Range    WBC 0-2 /hpf    RBC 0-2 /hpf    Bacteria Negative None /hpf    Epithelial Cells Negative /hpf    Hyaline Cast 0-2 /lpf   Salicylate   Result Value Ref Range    Salicylates, Quant. <1.0 (L) 15.0 - 25.0 mg/dL   TSH WITH REFLEX TO FT4   Result Value Ref Range    TSH 0.160 (L) 0.380 - 5.330 uIU/mL   Triglyceride   Result Value Ref Range    Triglycerides 74 0 - 149 mg/dL   ACETAMINOPHEN   Result Value Ref Range    Acetaminophen -Tylenol <5.0 (L) 10.0 - 30.0 ug/mL   DIAGNOSTIC ALCOHOL   Result Value Ref Range    Diagnostic Alcohol <10.1 <10.1 mg/dL   ABO Rh Confirm   Result Value Ref Range    ABO Rh Confirm B POS    FREE THYROXINE   Result Value Ref Range    Free T-4 1.61 0.93 - 1.70 ng/dL   ESTIMATED GFR   Result Value Ref Range    GFR (CKD-EPI) 56 (A) >60 mL/min/1.73 m 2   EKG (Now)   Result Value Ref Range    Report       [facility] Emergency Dept.    Test Date:  2024  Pt Name:    SPENCER INFANTE             Department: ER  MRN:        [id number]                      Room:        10  Gender:     Female                       Technician: [id number]  :        [id number]                   Requested By:JESSICA EL  Order #:    [id number]                    Reading MD:    Measurements  Intervals                                Axis  Rate:       125                          P:          122  OK:         62                           QRS:        98  QRSD:       119                          T:           -72  QT:         331  QTc:        478    Interpretive Statements  Sinus tachycardia  RBBB and LPFB  Borderline ST elevation, lateral leads  Baseline wander in lead(s) II,III,aVR,aVF  Compared to ECG 11/28/2016 13:50:13  Left posterior fascicular block now present  Right bundle-branch block now present  ST (T wave) deviation now present  Sinus rhythm no longer present  T-wave abnormality no  longer present     POCT arterial blood gas device results   Result Value Ref Range    Ph 7.230 (LL) 7.400 - 7.500    Pco2 45.0 (H) 26.0 - 37.0 mmHg    Po2 149 (H) 64 - 87 mmHg    Tco2 20 20 - 33 mmol/L    S02 99 93 - 99 %    Hco3 18.8 17.0 - 25.0 mmol/L    BE -9 (L) -4 - 3 mmol/L    Body Temp 35.0 C degrees    O2 Therapy 70 %    iPF Ratio 213     Ph Temp Milagros 7.258 (LL) 7.400 - 7.500    Pco2 Temp Co 41.2 (H) 26.0 - 37.0 mmHg    Po2 Temp Cor 138 (H) 64 - 87 mmHg    Specimen Arterial     DelSys Vent     Tidal Volume 350 mL    Peep End Expiratory Pressure 8 cmh20    Set Rate 26     Mode APV-CMV    POCT lactate device results   Result Value Ref Range    iStat Lactate 3.2 (H) 0.5 - 2.0 mmol/L     I have independently interpreted this EKG    RADIOLOGY  I have independently interpreted the diagnostic imaging associated with this visit and am waiting the final reading from the radiologist.   My preliminary interpretation is as follows: CT head demonstrating a large intraparenchymal bleed with midline shift and evidence of herniation.    Radiologist interpretation:  CT-HEAD W/O   Final Result      1.  Large left-sided intraparenchymal hemorrhage involving the majority of the left hemisphere with extension into the basal ganglia, thalamus and probably brainstem.   2.  There is impending left uncal and tentorial herniation.   3.  There is diffuse intraventricular hemorrhage with mass effect and attenuation of the left frontal horn with otherwise diffuse ventriculomegaly.   4.  There is estimated 1.6 cm of left-to-right midline shift.   5.   There is diffuse cerebral edema.      6.  A secure VOALTE message was sent and confirmed received to JESSICA EL on 4/5/2024 11:17 PM. Findings were discussed with JESSICA EL on 4/5/2024 at 11:29 PM.      Based solely on CT findings, the brain injury guideline category is mBIG 3.      EDH   IVH   Displaced skull fx   SDH > 8mm   IPH > 8mm or multiple   SAH bi-hemispheric or > 3mm      The original BIG retrospective analysis found radiographic progression in 0% of BIG 1 patients and 2.6% BIG 2.      DX-CHEST-PORTABLE (1 VIEW)   Final Result      1.  Satisfactory appearance of the ET tube.   2.  Mildly enlarged cardiac silhouette with changes of edema.          Intubation Procedure Note    Indication: Respiratory failure    Consent: Unable to be obtained due to the emergent nature of this procedure.    Medications Used: etomidate intravenously and succinycholine intravenously    Procedure: The patient was placed in the appropriate position.  Cricoid pressure was utilized. glidescope was used. Intubation was performed by video laryngoscopy, using a glidescope a 7.5 cuffed endotracheal tube.  The cuff was then inflated and the tube was secured appropriately at a distance of 24 cm to the dental ridge.  Initial confirmation of placement included bilateral breath sounds, an end tidal CO2 detector, absence of sounds over the stomach, and adequate chest rise.  A chest x-ray to verify correct placement of the tube showed appropriate tube position.    The patient tolerated the procedure well.     Complications:   None      COURSE & MEDICAL DECISION MAKING    ASSESSMENT, COURSE AND PLAN  Care Narrative:     10:40 PM patient arrived to the emergency department critically ill being bagged by EMS after being found unresponsive, profoundly hypertensive.  On arrival patient's systolic blood pressure in the 220s, initially tachycardic though quickly became bradycardic.  Concern for Cushing reflex in the setting of  examination highly concerning for spontaneous significant intracranial hemorrhage with elevated intracranial pressure and possible herniation.  Patient was very quickly intubated for airway protection and acute hypoxic respiratory failure utilizing etomidate and succinylcholine.  Patient was empirically administered 20 mg of IV labetalol and 150 mL of 3% hypertonic saline given concern for the above.  EKG was obtained demonstrating some diffuse ST depression but no ST elevation myocardial infarction.  Ordered for stat CT head CT angiogram chest to evaluate for intracranial hemorrhage versus acute aortic pathology given mottling of extremities.    10:50 PM and route to CT scanner patient suddenly became hypotensive confirmed bilaterally on upper extremities.  Propofol infusion was discontinued and 1 L of IV crystalloid administered.  Norepinephrine ordered.  Proceeded to CT scanner given need to understand underlying critical illness to appropriately guide intervention.    11:00 PM CT images reviewed.  Large left-sided intraparenchymal hemorrhage with midline shift and evidence of herniation.  Patient administered empiric desmopressin given medical records indicating aspirin use.  Began hyperventilating on the ventilator.  Neurosurgery paged.    11:15 PM Case discussed with Dr. Valdes, neurosurgeon.  No surgical intervention recommended given severity of patient's bleeding and examination preintubation of GCS of 3.  Recommends palliative care for medical futility.  Will attempt to discuss with family.  Paged intensivist.  Remainder of care proceeding as above.  Norepinephrine infusion at.05.  Recommends discussion with family for goals of care rapidly uptitrated to target goal MAP of 65.    11:30 PM Case discussed with intensivist Dr. Salas but agrees with assessment that further intervention is likely medically futile.  Does not recommend resuscitating patient in the event of loss of pulses and I am in  agreement with that given medical futility.  Will continue to keep patient comfortable, continue ongoing management as above awaiting discussion with family.    12:35 AM patient reassessed.  No change in examination.  Norepinephrine at 0.09 mics per kilogram per minute.    1:00 AM family at bedside and I had a long discussion regarding patient's current medical condition and very poor prognosis, consultant recommendations to pursue comfort focused care.  Family electing to do so and withdrawal additional life supportive care at this time.  They were allowed to say goodbye and ultimately requested not to be present during passing.    1:20 AM patient compassionately extubated, norepinephrine infusion discontinued.  Patient medicated with IV Dilaudid for pain, anxiety and air hunger per palliative protocol.    1:58 AM time of death appreciated by this provider.    CRITICAL CARE  The very real possibilty of a deterioration of this patient's condition required the highest level of my preparedness for sudden, emergent intervention.  I provided critical care services, which included medication orders, frequent reevaluations of the patient's condition and response to treatment, ordering and reviewing test results, and discussing the case with various consultants.  The critical care time associated with the care of the patient was 120 minutes. Review chart for interventions. This time is exclusive of any other billable procedures.     ADDITIONAL PROBLEMS MANAGED  Acute hypoxic respiratory failure    DISPOSITION AND DISCUSSIONS  I have discussed management of the patient with the following physicians and GERRY's: Dr. Valdes neurosurgery and Dr. Bruce Ross intensivist    Discussion of management with other [facility] or appropriate source(s): None     FINAL DIAGNOSIS  1. Intracranial bleeding (HCC)    2. Acute hypoxic respiratory failure (HCC)    3.      Critical care time of 120 minutes       Electronically signed by: Murray  JOSUÉ Moulton M.D., 4/5/2024 10:53 PM

## 2024-04-06 NOTE — PROGRESS NOTES
STEMI activation.  ECG reviewed does not meet criteria for STEMI.  Cancel STEMI alert.     Discussed very briefly with Dr. Moulton and there is concern for possible cranial process.      Please call cardiology back if formal consult needed.

## 2024-04-06 NOTE — PROGRESS NOTES
STEMI called upon patient's arrival. STEMI cancelled at 2246 by Dr. Huizar. Per EMS, pt unresponsive

## 2024-04-06 NOTE — ED NOTES
Family visited patient in ER. MD to bedside. Spoke with family regarding options- plan to withdrawal advanced life support. Family was able to say goodbye. Family requesting to leave and be called when pt passes

## 2024-04-06 NOTE — CONSULTS
"[facility] Neurosurgery  Dr Moulton  Reason for referral: ICH  Author: Fabien Valdes M.D. Date & Time created: 4/5/2024  11:23 PM     Interval History   63-year-old female who presents unresponsive emergently intubated for airway protection.  Her GCS was 3 with pupils fixed and dilated on arrival.  CT demonstrates large ICH.    ROS per H&P    Patient Active Problem List    Diagnosis Date Noted    Abdominal pain, other specified site 11/12/2013    Sepsis (HCC) 11/12/2013    Metabolic acidosis 11/13/2013    Lactic acidosis 11/13/2013    Malignant neoplasm of lower-outer quadrant of left breast of female, estrogen receptor positive (HCC) 06/28/2021    Wrist pain, chronic, right 03/23/2018    Neoplasm of uncertain behavior of breast 12/05/2016    COPD (chronic obstructive pulmonary disease) ([facility]) 11/18/2013    Hypokalemia 11/18/2013    Hyponatremia 11/18/2013    Thrombocytopenia (HCC) 11/18/2013    Appendicitis 11/18/2013    Troponin level elevated 11/18/2013    Obesity 11/18/2013    Methamphetamine abuse ([facility]) 11/18/2013    NURY (obstructive sleep apnea) 11/18/2013    Hypomagnesemia 11/18/2013    Bacteremia 11/18/2013    Tobacco dependence 11/18/2013       No data recorded.    Pulse: 65, Respiration: 20, Blood Pressure : (!) 223/107, Pulse Oximetry: 99 %         No intake or output data in the 24 hours ending 04/05/24 2323          etomidate        succinylcholine        labetalol        propofol        labetalol        fentaNYL        desmopressin (DDAVP) IVPB  0.4 mcg/kg (Order-Specific)      propofol  0-80 mcg/kg/min (Order-Specific)      NORepinephrine  0-1 mcg/kg/min (Order-Specific) 0.05 mcg/kg/min (04/05/24 2330)       No results for input(s): \"WBC\", \"RBC\", \"HEMOGLOBIN\", \"ISTATHGB\", \"HEMATOCRIT\", \"ISTATHEMCRT\", \"MCV\", \"MCH\", \"PLATELETCT\" in the last 72 hours.  No results for input(s): \"SODIUM\", \"ISTATSODIUM\", \"POTASSIUM\", \"ISTATPOTASS\", \"CHLORIDE\", \"ISTATCHLORI\", \"CO2\", \"ISTATCO2\", \"GLUCOSE\", \"ISTATGLUCOS\", " "\"BUN\", \"ISTATBUN\", \"CREATININE\", \"ISTATCREAT\", \"CALCIUM\" in the last 72 hours.      Assessment and plan: 63-year-old female with large ICH DCFS 3 pupils fixed and dilated.  This was nonrecoverable injury.  Neurosurgical intervention would be futile.  Neurosurgery will sign off.    "

## 2024-04-06 NOTE — ED NOTES
Per - pt ok to go to jed at [facility] as they will not be examining the body, just picking up bloodwork in the morning from [facility] lab. Per - ok to remove all lines and drains.

## 2024-04-06 NOTE — ED NOTES
Medicated per MAR for possibility of pain- pt occasionally breathing over ventilator, mostly when stimulated.

## 2024-04-06 NOTE — DISCHARGE PLANNING
Referral: Acute Medical Patient Response    Intervention: SW responded to acute medical patient.  Pt was BIB [facility] (1) after being found down.  Pt was unresponsive upon arrival.  Pts name is Lupis Colunga  (: 1960).  SW obtained the following pt information: Per EMS Pt was found down at home in her room by her son.  SW contacted Pt's Son, Young and updated him that Pt is critically ill.  Family is on their way to [facility] now.     Plan: SW will remain available to assist if needs arise.     SPOUSE: Pietro Colunga [phone]  SON: Young Ferrer [phone]   NEPHEW: Ryan [phone]   Ryan's Wife: Sonia [phone]  NEPHEW: Diogenes [phone] (lives out of town)

## 2024-04-06 NOTE — ED NOTES
Pt became suddenly hypotensive on way to CT scan. 1 L NS started. Propofol D/C'd. Pt bilaterally hypotensive in R and L arm at 65/45mmhg

## 2024-04-11 LAB
BACTERIA BLD CULT: NORMAL
BACTERIA BLD CULT: NORMAL
SIGNIFICANT IND 70042: NORMAL
SIGNIFICANT IND 70042: NORMAL
SITE SITE: NORMAL
SITE SITE: NORMAL
SOURCE SOURCE: NORMAL
SOURCE SOURCE: NORMAL

## (undated) DEVICE — DRAPE LARGE 3 QUARTER - (20/CA)

## (undated) DEVICE — SET EXTENSION WITH 2 PORTS (48EA/CA) ***PART #2C8610 IS A SUBSTITUTE*****

## (undated) DEVICE — MASK, LARYNGEAL AIRWAY #4

## (undated) DEVICE — PAD PREP 24 X 48 CUFFED - (100/CA)

## (undated) DEVICE — 4-0 PDS RB-1

## (undated) DEVICE — NEPTUNE 4 PORT MANIFOLD - (20/PK)

## (undated) DEVICE — DRAPE C-ARM LARGE 41IN X 74 IN - (10/BX 2BX/CA)

## (undated) DEVICE — PLASTER ROLL 4X5YD XTRA FAST - 2-4 MIN (12EA/BX 6BX/CA)"

## (undated) DEVICE — GLOVE BIOGEL PI INDICATOR SZ 6.5 SURGICAL PF LF - (50/BX 4BX/CA)

## (undated) DEVICE — GLOVE BIOGEL PI ORTHO SZ 7.5 PF LF (40PR/BX)

## (undated) DEVICE — HEAD HOLDER JUNIOR/ADULT

## (undated) DEVICE — STOCKINET BIAS 4 IN STERILE - (20/CA)

## (undated) DEVICE — SENSOR SPO2 NEO LNCS ADHESIVE (20/BX) SEE USER NOTES

## (undated) DEVICE — SODIUM CHL IRRIGATION 0.9% 1000ML (12EA/CA)

## (undated) DEVICE — CHLORAPREP 26 ML APPLICATOR - ORANGE TINT(25/CA)

## (undated) DEVICE — SLING ORTH UNV TIETX VLFM ARM

## (undated) DEVICE — SLEEVE, VASO, THIGH, MED

## (undated) DEVICE — TOWELS CLOTH SURGICAL - (4/PK 20PK/CA)

## (undated) DEVICE — MASK ANESTHESIA ADULT  - (100/CA)

## (undated) DEVICE — GLOVE SZ 6 BIOGEL PI MICRO - PF LF (50PR/BX 4BX/CA)

## (undated) DEVICE — SPLINT PLASTER 4 IN  X 15 IN - (50/BX 12BX/CA)

## (undated) DEVICE — KIT ANESTHESIA W/CIRCUIT & 3/LT BAG W/FILTER (20EA/CA)

## (undated) DEVICE — SUCTION INSTRUMENT YANKAUER BULBOUS TIP W/O VENT (50EA/CA)

## (undated) DEVICE — SUTURE GENERAL

## (undated) DEVICE — SET LEADWIRE 5 LEAD BEDSIDE DISPOSABLE ECG (1SET OF 5/EA)

## (undated) DEVICE — LACTATED RINGERS INJ 1000 ML - (14EA/CA 60CA/PF)

## (undated) DEVICE — GLOVE BIOGEL SZ 7.5 SURGICAL PF LTX - (50PR/BX 4BX/CA)

## (undated) DEVICE — KIT ROOM DECONTAMINATION

## (undated) DEVICE — TUBING CLEARLINK DUO-VENT - C-FLO (48EA/CA)

## (undated) DEVICE — ELECTRODE DUAL RETURN W/ CORD - (50/PK)

## (undated) DEVICE — PACK LOWER EXTREMITY - (2/CA)

## (undated) DEVICE — TOURNIQUET CUFF 18 X 3 ONE PORT DISP - STERILE (10/BX)

## (undated) DEVICE — ELECTRODE 850 FOAM ADHESIVE - HYDROGEL RADIOTRNSPRNT (50/PK)

## (undated) DEVICE — PADDING CAST 4 IN STERILE - 4 X 4 YDS (24/CA)

## (undated) DEVICE — CANISTER SUCTION 3000ML MECHANICAL FILTER AUTO SHUTOFF MEDI-VAC NONSTERILE LF DISP  (40EA/CA)

## (undated) DEVICE — SUTURE 4-0 ETHILON PS-2 18 BLACK (36PK/BX)

## (undated) DEVICE — PROTECTOR ULNA NERVE - (36PR/CA)